# Patient Record
Sex: FEMALE | Race: BLACK OR AFRICAN AMERICAN | NOT HISPANIC OR LATINO | Employment: FULL TIME | URBAN - METROPOLITAN AREA
[De-identification: names, ages, dates, MRNs, and addresses within clinical notes are randomized per-mention and may not be internally consistent; named-entity substitution may affect disease eponyms.]

---

## 2023-10-31 ENCOUNTER — TELEPHONE (OUTPATIENT)
Dept: SURGERY | Facility: CLINIC | Age: 43
End: 2023-10-31
Payer: MEDICAID

## 2023-11-02 RX ORDER — DULOXETIN HYDROCHLORIDE 20 MG/1
20 CAPSULE, DELAYED RELEASE ORAL DAILY
COMMUNITY
End: 2023-11-06

## 2023-11-02 RX ORDER — LAMOTRIGINE 100 MG/1
100 TABLET ORAL DAILY
COMMUNITY
End: 2023-11-06

## 2023-11-02 RX ORDER — FLUVOXAMINE MALEATE 25 MG/1
25 TABLET ORAL NIGHTLY
COMMUNITY
End: 2023-11-06

## 2023-11-02 RX ORDER — PANTOPRAZOLE SODIUM 40 MG/1
40 TABLET, DELAYED RELEASE ORAL DAILY
COMMUNITY

## 2023-11-02 RX ORDER — MELOXICAM 7.5 MG/1
7.5 TABLET ORAL DAILY
COMMUNITY
End: 2024-03-25

## 2023-11-02 RX ORDER — LEVOTHYROXINE SODIUM 100 UG/1
100 TABLET ORAL
COMMUNITY
End: 2024-03-25

## 2023-11-02 RX ORDER — METFORMIN HYDROCHLORIDE 500 MG/1
500 TABLET ORAL 2 TIMES DAILY WITH MEALS
COMMUNITY
End: 2023-11-06

## 2023-11-02 RX ORDER — LISINOPRIL 5 MG/1
5 TABLET ORAL DAILY
COMMUNITY

## 2023-11-02 RX ORDER — DEXTROAMPHETAMINE SACCHARATE, AMPHETAMINE ASPARTATE MONOHYDRATE, DEXTROAMPHETAMINE SULFATE AND AMPHETAMINE SULFATE 3.75; 3.75; 3.75; 3.75 MG/1; MG/1; MG/1; MG/1
15 CAPSULE, EXTENDED RELEASE ORAL EVERY MORNING
COMMUNITY
End: 2023-11-06

## 2023-11-06 ENCOUNTER — CLINICAL SUPPORT (OUTPATIENT)
Dept: BARIATRICS | Facility: HOSPITAL | Age: 43
End: 2023-11-06

## 2023-11-06 ENCOUNTER — OFFICE VISIT (OUTPATIENT)
Dept: SURGERY | Facility: CLINIC | Age: 43
End: 2023-11-06
Payer: MEDICAID

## 2023-11-06 VITALS
BODY MASS INDEX: 51.23 KG/M2 | WEIGHT: 289.13 LBS | DIASTOLIC BLOOD PRESSURE: 83 MMHG | HEIGHT: 63 IN | SYSTOLIC BLOOD PRESSURE: 138 MMHG | HEART RATE: 81 BPM

## 2023-11-06 VITALS — WEIGHT: 289.13 LBS | BODY MASS INDEX: 51.23 KG/M2 | HEIGHT: 63 IN

## 2023-11-06 DIAGNOSIS — K21.9 GASTROESOPHAGEAL REFLUX DISEASE, UNSPECIFIED WHETHER ESOPHAGITIS PRESENT: ICD-10-CM

## 2023-11-06 DIAGNOSIS — E66.01 MORBID OBESITY WITH BMI OF 50.0-59.9, ADULT: Primary | ICD-10-CM

## 2023-11-06 DIAGNOSIS — K44.9 HIATAL HERNIA: ICD-10-CM

## 2023-11-06 DIAGNOSIS — E66.9 OBESITY, UNSPECIFIED CLASSIFICATION, UNSPECIFIED OBESITY TYPE, UNSPECIFIED WHETHER SERIOUS COMORBIDITY PRESENT: Primary | ICD-10-CM

## 2023-11-06 DIAGNOSIS — Z87.19 HISTORY OF ESOPHAGEAL STRICTURE: ICD-10-CM

## 2023-11-06 DIAGNOSIS — E66.9 OBESITY: Primary | ICD-10-CM

## 2023-11-06 PROCEDURE — 1159F PR MEDICATION LIST DOCUMENTED IN MEDICAL RECORD: ICD-10-PCS | Mod: CPTII,,, | Performed by: SURGERY

## 2023-11-06 PROCEDURE — 3008F BODY MASS INDEX DOCD: CPT | Mod: CPTII,,, | Performed by: SURGERY

## 2023-11-06 PROCEDURE — 3079F PR MOST RECENT DIASTOLIC BLOOD PRESSURE 80-89 MM HG: ICD-10-PCS | Mod: CPTII,,, | Performed by: SURGERY

## 2023-11-06 PROCEDURE — 3008F PR BODY MASS INDEX (BMI) DOCUMENTED: ICD-10-PCS | Mod: CPTII,,, | Performed by: SURGERY

## 2023-11-06 PROCEDURE — 99204 OFFICE O/P NEW MOD 45 MIN: CPT | Mod: ,,, | Performed by: SURGERY

## 2023-11-06 PROCEDURE — 1159F MED LIST DOCD IN RCRD: CPT | Mod: CPTII,,, | Performed by: SURGERY

## 2023-11-06 PROCEDURE — 99204 PR OFFICE/OUTPT VISIT, NEW, LEVL IV, 45-59 MIN: ICD-10-PCS | Mod: ,,, | Performed by: SURGERY

## 2023-11-06 PROCEDURE — 3075F PR MOST RECENT SYSTOLIC BLOOD PRESS GE 130-139MM HG: ICD-10-PCS | Mod: CPTII,,, | Performed by: SURGERY

## 2023-11-06 PROCEDURE — 4010F PR ACE/ARB THEARPY RXD/TAKEN: ICD-10-PCS | Mod: CPTII,,, | Performed by: SURGERY

## 2023-11-06 PROCEDURE — 3075F SYST BP GE 130 - 139MM HG: CPT | Mod: CPTII,,, | Performed by: SURGERY

## 2023-11-06 PROCEDURE — 3079F DIAST BP 80-89 MM HG: CPT | Mod: CPTII,,, | Performed by: SURGERY

## 2023-11-06 PROCEDURE — 4010F ACE/ARB THERAPY RXD/TAKEN: CPT | Mod: CPTII,,, | Performed by: SURGERY

## 2023-11-06 NOTE — PROGRESS NOTES
"NUTRITIONAL CONSULT    Initial assessment for sleeve gastrectomy  Non Surgical: []    PAST HISTORY:   Dieting attempts include Nutrisytem  and Diet supplements/ medication Ozempic and Adipex/ Phentermine  Highest adult weight: 289#  How many years at present wt:   Greatest single wt loss:  40#     CLINICAL DATA:  Height:   Ht Readings from Last 1 Encounters:   23 5' 2.5" (1.588 m)      Weight:   Wt Readings from Last 3 Encounters:   23 1459 131.1 kg (289 lb 1.6 oz)   23 1434 131.1 kg (289 lb 1.6 oz)      IBW: 115 lbs   BMI:   BMI Readings from Last 1 Encounters:   23 52.03 kg/m²      Bariatric goal weight (125% EBW): 144 lbs  Patient's goal weight: 200 lbs    FAMILY HISTORY OF OBESITY:  No           WHAT SIDE OF THE FAMILY?   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 Eats 2-3 times per week.  Fruits: Likes a variety. Eats almost daily.  Beverages: soda and juice   Alcohol consumption:  socially   Type:   Dining out: Daily. Mostly fast food, restaurants, and take-out.  Grocery shopping and food prep: Yes[x]Self   []Spouse   []Other:   Emotional eating: Yes Which emotions if yes: stress and happiness   Nighttime snacking: Yes Middle of night: Yes Before bedtime: Nob   sweets or chips   Hx of disordered eating behaviors:  Anorexia: No Bulimia:  Purging: No Binging:No  History of vitamin/mineral deficiencies:   No    Vitamins / Minerals / Herbs:    going " through menopause   Mg    Food Allergies:   None       ASSESSMENT:  Patient reports attempts at weight loss, only to regain lost weight.  Patient demonstrated knowledge of healthy eating behaviors and exercise patterns; admits to not eating healthy and not exercising at this point.  Patient states willingness to change lifestyle and make behavior modifications.  Expect fair  compliance after surgery at this time.        ESTIMATED NEEDS:  Calories: 7357-0845 (20-25 kcal/kg adjusted BW/d)  Protein: 65-72 (1.0-1.1 g/kg adjusted BW/d)  Fluid:  1300 (20 mL/kg actual BW/d)    BARIATRIC DIET DISCUSSION:  Discussed diet after surgery and related to patients food record.  Reviewed diet progression before and after surgery.  Reinforced that surgery is not a magic bullet and importance of low fat foods and no snacking.  Answered all questions.    RECOMMENDATIONS:  Patient is a good candidate for bariatric surgery.      PLAN:  Continue to review Bariatric Nutrition Guidebook at home and call with any questions.  Work on Bariatric Nutrition Checklist.  Work on expanding variety of vegetables.  Work on gradually cutting back on starchy CHO in the diet.  1500-calorie diet.  5-6 meals per day.  Start including protein supplements in the diet plan daily.  More grocery shopping and meal preparation at home.  Start shopping for bariatric vitamins & minerals.

## 2023-11-06 NOTE — PROGRESS NOTES
[facility] Surgical - General Surgery Services  [address] [place] [id number]  Phone: [phone]  Fax: [phone]    Initial Bariatric Consultation  Dr. Marcos Pérez  1980    Author: DAVIDA Mensah      History of Present Illness:  Patient is a 43 y.o. female who is referred for evaluation of surgical treatment of morbid obesity. Her Body mass index is 52.03 kg/m². She has known comorbidities of dyslipidemia, GERD, hypertension, and obstructive sleep apnea. She has attended the bariatric seminar and is most interested in the sleeve gastrectomy procedure. The patient has had multiple unsuccessful diet attempts in the past without sustained weight loss. With multiple unsuccessful weight loss attempts, the patient believes they are ready for a bariatric surgical intervention.     Denies any anemia, bleeding or clotting disorders, easy bruisability, or previous thrombosis. No family history of clotting issues.     Ht: 62.5 in.  Weights:     Trend Weights BMI   Weight today at consult 289#  52             Pertinent History:  HTN - [x] Yes   [] No  HLD - [x] Yes   [] No  DM  -  [] Yes   [x] No  ISIDRA - [x] Yes   [] No  GERD - [x] Yes   [] No (severe)  Anticoagulation- [] Yes   [x] No  Smoker- [] Yes   [x] No Former smoker.       History of chronic GERD, hiatal hernia, esophageal stricture.  No previous GI records available at time of consult.     Past Medical History:   Diagnosis Date    Anxiety     Depression     Esophageal stricture     Former smoker     GERD (gastroesophageal reflux disease)     Hiatal hernia     Hyperlipidemia     Hypertension     Morbid obesity     Sleep apnea, unspecified     SOB (shortness of breath) on exertion     Thyroid disease     hypothyroidism    Vitamin D deficiency      Past Surgical History:   Procedure Laterality Date     SECTION      x 2    TUBAL LIGATION       No family history on file.  Social History     Tobacco  "Use    Smoking status: Former     Types: Cigarettes   Substance Use Topics    Alcohol use: Not Currently          Review of patient's allergies indicates:  No Known Allergies    Current Outpatient Medications   Medication Sig Dispense Refill    albuterol sulfate 90 mcg/actuation aebs Inhale 180 mcg into the lungs every 4 (four) hours.      cholecalciferol, vitamin D3, 1,250 mcg/3 mL Drop Take by mouth.      dextroamphetamine-amphetamine (ADDERALL XR) 15 MG 24 hr capsule Take 15 mg by mouth every morning.      DULoxetine (CYMBALTA) 20 MG capsule Take 20 mg by mouth once daily.      fluvoxaMINE (LUVOX) 25 MG tablet Take 25 mg by mouth every evening.      lamoTRIgine (LAMICTAL) 100 MG tablet Take 100 mg by mouth once daily.      levothyroxine (SYNTHROID) 100 MCG tablet Take 100 mcg by mouth before breakfast.      lisinopriL (PRINIVIL,ZESTRIL) 5 MG tablet Take 5 mg by mouth once daily.      meloxicam (MOBIC) 7.5 MG tablet Take 7.5 mg by mouth once daily.      metFORMIN (GLUCOPHAGE) 500 MG tablet Take 500 mg by mouth 2 (two) times daily with meals.      pantoprazole (PROTONIX) 40 MG tablet Take 40 mg by mouth once daily.       No current facility-administered medications for this visit.       Patient Care Team:  No, Primary Doctor as PCP - General  Marcos Quintero MD as Surgeon (Bariatrics)    Review of Systems:  Review of Systems   Constitutional: Negative.    HENT: Negative.     Eyes: Negative.    Respiratory: Negative.     Cardiovascular: Negative.    Gastrointestinal: Negative.    Endocrine: Negative.    Genitourinary: Negative.    Musculoskeletal: Negative.    Skin: Negative.    Allergic/Immunologic: Negative.    Neurological: Negative.    Psychiatric/Behavioral: Negative.     All other systems reviewed and are negative.      Physical:     Vitals:    11/06/23 1459   BP: 138/83   Pulse: 81   Weight: 131.1 kg (289 lb 1.6 oz)   Height: 5' 2.5" (1.588 m)     Body mass index is 52.03 kg/m².    Physical Exam:  Physical " Exam  Vitals reviewed.   Constitutional:       General: She is not in acute distress.     Appearance: Normal appearance.   HENT:      Head: Normocephalic.   Eyes:      Conjunctiva/sclera: Conjunctivae normal.      Pupils: Pupils are equal, round, and reactive to light.   Cardiovascular:      Rate and Rhythm: Normal rate and regular rhythm.      Pulses: Normal pulses.      Heart sounds: Normal heart sounds.   Pulmonary:      Effort: Pulmonary effort is normal. No respiratory distress.      Breath sounds: Normal breath sounds.   Abdominal:      General: Abdomen is flat. Bowel sounds are normal.      Palpations: Abdomen is soft.      Tenderness: There is no abdominal tenderness.   Musculoskeletal:         General: Normal range of motion.      Cervical back: Neck supple.   Skin:     General: Skin is warm and dry.   Neurological:      General: No focal deficit present.      Mental Status: She is alert and oriented to person, place, and time.   Psychiatric:         Mood and Affect: Mood normal.         Behavior: Behavior normal.         ASSESSMENT/PLAN:        1. Morbid obesity with BMI of 50.0-59.9, adult        2. Gastroesophageal reflux disease, unspecified whether esophagitis present        3. Hiatal hernia        4. History of esophageal stricture            Plan:  Valerie Pérez has morbid obesity as their Body mass index is 52.03 kg/m². She would benefit from weight loss surgery and has chosen Laparoscopic sleeve gastrectomy as the preferred procedure. She understands that this is a tool and lifestyle change will be necessary to keep weight off. Dr. Marcos Quintero discussed possible complications of all surgeries with the patient and she is agreeable to surgery. Patient informed that sleeve gastrectomy may result in symptoms of worsening reflux, possibly requiring long term medication, or additional surgical intervention in the future (including but not limited to conversion to Sheela-en-Y gastric bypass). Future  surgical intervention may/may not be covered by patient's insurance. Patient accepts this risk and is willing to proceed with workup for sleeve gastrectomy procedure.     RECOMMENDATION: Weight loss surgery. The risks, benefits of bariatric surgery, and non-surgical alternatives were discussed at length and all  questions were answered. The patient verbalizes understanding and wishes to proceed.     - Obtain previous GI records RE GERD and hiatal hernia, esophageal stricture if possible for review.  - Order esophagram to evaluate severity of GERD   - Order H Pylori serology  - Schedule EGD upper endoscopy  - Refer to bariatric department for pre op education, nutritional counseling.   - Refer for pre op psychological evaluation.  - Dr. Quintero examined patient, discussed recommendations, and answered all questions.     DAVIDA Mensah      I, DAVIDA Montes, am scribing for, and in the presence of, Marcos Quintero MD, performed the above scribed service and the documentation accurately describes the services I performed. I attest to the accuracy of the note.

## 2023-11-06 NOTE — PROGRESS NOTES
BEHAVIOR MODIFICATION AND EXERCISE CONSULT    PERSONAL:     What initiated your interest in bariatric surgery? Hypothyroidism, HTN, High cholesterol    Marital Status: [x]Single   []   []   []      Do you have children? 3 (19,20,22)    What is your highest level of education completed? 11th grade    Who is your social or relational support? friends    Do you work? 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 yes    Were there any particular events that lead to significant weight gain? []Loss of a loved one  []Pregnancy  [x]Trauma, accident, illness  []Loss of employment    PHYSICAL ACTIVITY:    Do you currently exercise: []Yes   [x]No    If so, please describe:    Have you experienced any injuries and/or restrictions that may limit your physical activity? [x]Yes   []No    If so, please describe: knees    How do you feel about exercise? I would like to do it.    BEHAVIORS:    What behavior(s) would you like to change in order to be healthy? Eating habits, emotional eating, exercise.    On a scale of 1-10 (1-extremely low, 10-extremely high), how motivated are you to change your behavior(s)? 10    Do you currently use any type of tobacco products (vape, dip, cigarettes, etc.) No    If yes, on average, how many and/or how often do you use these products on a daily basis?    How many hours of sleep do you average? 7    Rate your stress level on a scale of 1-10? 8     (1-extremely low, 10-extremely high)     What is your biggest stressor? Taking care of mom or family member    Is your appetite affected by stress, boredom, depression, etc.? yes    How do you cope and/or manage stress? Be along and listen to music    Have you ever seen a counselor or therapist? Yes, depression, ADHD    CURRENT WEIGHT: 289  HIGHEST WEIGHT:289  LOWEST ADULT WEIGHT: 230    WAIST/HIP: 53/60    HANDOUTS: Emotional connections to food, stress  management    NOTES: Body composition was conducted and patient was educated on results.    CORIE Khoury, CPT, CHC        CORIE Khoury, CPT, CHC

## 2023-11-07 ENCOUNTER — TELEPHONE (OUTPATIENT)
Dept: SURGERY | Facility: CLINIC | Age: 43
End: 2023-11-07
Payer: MEDICAID

## 2023-11-07 DIAGNOSIS — K21.9 GASTROESOPHAGEAL REFLUX DISEASE, UNSPECIFIED WHETHER ESOPHAGITIS PRESENT: Primary | ICD-10-CM

## 2023-11-07 NOTE — TELEPHONE ENCOUNTER
Esophagram scheduled for 11/13/2023 at 1:00 p.m. @ [facility].   Attempted to notify pt. No answer, unable to LVM

## 2023-11-07 NOTE — TELEPHONE ENCOUNTER
If pt needs to reschedule testing per [place] she will have to call herself. #[phone]    Initial (On Arrival)

## 2023-11-13 ENCOUNTER — OUTSIDE PLACE OF SERVICE (OUTPATIENT)
Dept: INTERVENTIONAL RADIOLOGY/VASCULAR | Facility: CLINIC | Age: 43
End: 2023-11-13
Payer: MEDICAID

## 2023-11-13 PROCEDURE — 74220 X-RAY XM ESOPHAGUS 1CNTRST: CPT | Mod: 26,,, | Performed by: STUDENT IN AN ORGANIZED HEALTH CARE EDUCATION/TRAINING PROGRAM

## 2023-11-16 ENCOUNTER — TELEPHONE (OUTPATIENT)
Dept: SURGERY | Facility: CLINIC | Age: 43
End: 2023-11-16
Payer: MEDICAID

## 2023-11-16 NOTE — TELEPHONE ENCOUNTER
----- Message from DAVIDA Mensah sent at 11/16/2023  3:31 PM CST -----  Small hiatal hernia but moderate amount of GERD.     Proceed with EGD as a next step.     After EGD findings, will determine if still a good candidate for VSG or if RNY needed.     Please inform pt.   ----- Message -----  From: Esther Urbano MA  Sent: 11/15/2023   1:41 PM CST  To: DAVIDA Mensah; Haily Ta RN; #    Esophagram results now in chart. Small HH and moderate GERD.  Please advise. Pt medicaid VSG

## 2023-11-21 ENCOUNTER — CLINICAL SUPPORT (OUTPATIENT)
Dept: BARIATRICS | Facility: HOSPITAL | Age: 43
End: 2023-11-21

## 2023-11-21 VITALS — WEIGHT: 285 LBS | HEIGHT: 63 IN | BODY MASS INDEX: 50.5 KG/M2

## 2023-11-21 DIAGNOSIS — E66.01 MORBID OBESITY WITH BMI OF 50.0-59.9, ADULT: Primary | ICD-10-CM

## 2023-11-21 PROCEDURE — 94200034 HC BARIATRIC NUTRITIONAL SVCS PT GRADE I: Performed by: DIETITIAN, REGISTERED

## 2023-11-21 NOTE — PROGRESS NOTES
"Patient Education [x] [facility] Visit Number: 1/3     []  Pre-op Wt Loss Goal (as ordered on referral):   [] [facility] Visit:     Height:   Ht Readings from Last 1 Encounters:   11/21/23 5' 2.5" (1.588 m)      Weight:   Wt Readings from Last 3 Encounters:   11/21/23 1536 129.3 kg (285 lb)   11/06/23 1459 131.1 kg (289 lb 1.6 oz)   11/06/23 1434 131.1 kg (289 lb 1.6 oz)      BMI:   BMI Readings from Last 1 Encounters:   11/21/23 51.30 kg/m²                                                                          Barriers to learning:  [x]None evident  []Acuity of illness  []Cognitive defects  []Cultural barriers  []Desire/Motivation  []Difficulty concentrating  []Emotional state  []Financial concerns  []Hearing deficit  []Language barrier  []Literacy  []Memory problems  []Vision impairment     Home caregiver present for session   []YES  [x] NO       Teaching methods:  []Demonstration  [x]Explanation  []Printed materials  []Teach back  []Virtual/web based         Verbalizes understanding Demonstrates  Needs further teaching Needs practice/ supervision Comments    Bariatric Surgery Diet  [] [] [] []    Clear liquid [] [] [] []    Full liquid [] [] [] []    Weight Reduction [x] [] [] []    Other Diet [] [] [] []          Additional Learner (s) Present                                                                  []Spouse   []Daughter    []  Son  []Family member   []Friend   []Grandfather   []Grandmother   []Father   []Mother   []Other       Expected Compliance:  [x]Good  []Fair  []Poor    Additional Information:    Pt with 5# wt loss since last visit. Pt has eliminated sodas completely; replacing these with Gatorade Zero, SF Chuy Aid flavored water. Pt denies snacking on chips, sweets, cookies. Pt has been prepping meals for workdays to remain on track with diet.    24 hr recall:  B- egg  L- chicken, tomatoes, carrots  Sn- fruit, beef jerky, boiled egg  S- similar to lunch  Bevs- gatorade zero, SF chuy aid    Plan:  Continue " current meal plan.  Daily practice of bariatric eating habits.

## 2023-11-22 NOTE — TELEPHONE ENCOUNTER
I've tried reaching out to patient multiple times with no answers or call backs. I did email her the results.

## 2023-12-07 ENCOUNTER — TELEPHONE (OUTPATIENT)
Dept: SURGERY | Facility: CLINIC | Age: 43
End: 2023-12-07
Payer: MEDICAID

## 2023-12-07 DIAGNOSIS — K21.9 GASTROESOPHAGEAL REFLUX DISEASE, UNSPECIFIED WHETHER ESOPHAGITIS PRESENT: Primary | ICD-10-CM

## 2023-12-07 NOTE — TELEPHONE ENCOUNTER
This EGD is from 2021. Needs repeat EGD with Dr. Quintero if not already scheduled.   ----- Message -----   From: Arlet Cross   Sent: 11/29/2023  10:25 AM CST   To: DAVIDA Mensah; Esther Urbano MA   Subject: FW: Import                                        Please see attached pt records from GI as requested.   Please advise if pt needs to repeat EGD w/ Dr. Quintero or if any other testing needs to be completed.   ----- Message -----   From: Loraine Hoskins   Sent: 11/29/2023  10:15 AM CST   To: Arlet Cross   Subject: Import                                            Imported by Loraine Hoskins on 11/29/2023 at 10:15 AM to the following: Valerie Pérez

## 2023-12-19 ENCOUNTER — CLINICAL SUPPORT (OUTPATIENT)
Dept: BARIATRICS | Facility: HOSPITAL | Age: 43
End: 2023-12-19

## 2023-12-19 VITALS — BODY MASS INDEX: 51.94 KG/M2 | WEIGHT: 288.63 LBS

## 2023-12-19 DIAGNOSIS — E66.9 OBESITY, UNSPECIFIED CLASSIFICATION, UNSPECIFIED OBESITY TYPE, UNSPECIFIED WHETHER SERIOUS COMORBIDITY PRESENT: Primary | ICD-10-CM

## 2023-12-19 PROCEDURE — 94200034 HC BARIATRIC NUTRITIONAL SVCS PT GRADE I

## 2023-12-19 NOTE — PROGRESS NOTES
"Patient Education [x] [facility] Visit Number:  2/3      []  Pre-op Wt Loss Goal (as ordered on referral):   [] [facility] Visit:     Height:   Ht Readings from Last 1 Encounters:   11/21/23 5' 2.5" (1.588 m)      Weight:   Wt Readings from Last 3 Encounters:   12/19/23 1525 130.9 kg (288 lb 9.6 oz)   11/21/23 1536 129.3 kg (285 lb)   11/06/23 1459 131.1 kg (289 lb 1.6 oz)      BMI:   BMI Readings from Last 1 Encounters:   12/19/23 51.94 kg/m²                                                                          Barriers to learning:  [x]None evident  []Acuity of illness  []Cognitive defects  []Cultural barriers  []Desire/Motivation  []Difficulty concentrating  []Emotional state  []Financial concerns  []Hearing deficit  []Language barrier  []Literacy  []Memory problems  []Vision impairment     Home caregiver present for session   []YES  [] NO       Teaching methods:  []Demonstration  []Explanation  []Printed materials  []Teach back  []Virtual/web based         Verbalizes understanding Demonstrates  Needs further teaching Needs practice/ supervision Comments    Bariatric Surgery Diet  [] [] [] []    Clear liquid [] [] [] []    Full liquid [] [] [] []    Weight Reduction [x] [] [] []    Other Diet [] [] [] []          Additional Learner (s) Present                                                                  []Spouse   []Daughter    []  Son  []Family member   []Friend   []Grandfather   []Grandmother   []Father   []Mother   []Other       Expected Compliance:  []Good  []Fair  []Poor    Additional Information:    Pt has a stressful month so she stopped prepping and planing her meals. She gained back the weigh that she lost last month. We talked about other ways to cope with her stress like walking. She said she knows she feels better when she eats the right foods.   She will get back on track this month       Plan:  Meal prep and plan   Portion out - follow modified pre op diet     "

## 2024-01-16 ENCOUNTER — TELEPHONE (OUTPATIENT)
Dept: BARIATRICS | Facility: HOSPITAL | Age: 44
End: 2024-01-16

## 2024-01-17 ENCOUNTER — TELEPHONE (OUTPATIENT)
Dept: BARIATRICS | Facility: HOSPITAL | Age: 44
End: 2024-01-17

## 2024-03-25 ENCOUNTER — OFFICE VISIT (OUTPATIENT)
Dept: OBSTETRICS AND GYNECOLOGY | Facility: CLINIC | Age: 44
End: 2024-03-25
Payer: COMMERCIAL

## 2024-03-25 VITALS
BODY MASS INDEX: 52.56 KG/M2 | SYSTOLIC BLOOD PRESSURE: 138 MMHG | DIASTOLIC BLOOD PRESSURE: 89 MMHG | HEART RATE: 95 BPM | WEIGHT: 292 LBS

## 2024-03-25 DIAGNOSIS — Z76.89 ENCOUNTER TO ESTABLISH CARE: ICD-10-CM

## 2024-03-25 DIAGNOSIS — N95.2 VAGINAL ATROPHY: ICD-10-CM

## 2024-03-25 DIAGNOSIS — N92.0 MENORRHAGIA WITH REGULAR CYCLE: ICD-10-CM

## 2024-03-25 DIAGNOSIS — N95.1 MENOPAUSE SYNDROME: Primary | ICD-10-CM

## 2024-03-25 DIAGNOSIS — R39.15 URINARY URGENCY: ICD-10-CM

## 2024-03-25 DIAGNOSIS — R35.1 NOCTURIA: ICD-10-CM

## 2024-03-25 LAB
B-HCG UR QL: NEGATIVE
CTP QC/QA: YES

## 2024-03-25 PROCEDURE — 3079F DIAST BP 80-89 MM HG: CPT | Mod: CPTII,S$GLB,,

## 2024-03-25 PROCEDURE — 99203 OFFICE O/P NEW LOW 30 MIN: CPT | Mod: S$GLB,,,

## 2024-03-25 PROCEDURE — 3075F SYST BP GE 130 - 139MM HG: CPT | Mod: CPTII,S$GLB,,

## 2024-03-25 PROCEDURE — 3008F BODY MASS INDEX DOCD: CPT | Mod: CPTII,S$GLB,,

## 2024-03-25 PROCEDURE — 4010F ACE/ARB THERAPY RXD/TAKEN: CPT | Mod: CPTII,S$GLB,,

## 2024-03-25 PROCEDURE — 1159F MED LIST DOCD IN RCRD: CPT | Mod: CPTII,S$GLB,,

## 2024-03-25 PROCEDURE — 1160F RVW MEDS BY RX/DR IN RCRD: CPT | Mod: CPTII,S$GLB,,

## 2024-03-25 PROCEDURE — 81025 URINE PREGNANCY TEST: CPT | Mod: S$GLB,,,

## 2024-03-25 RX ORDER — FLUVOXAMINE MALEATE 50 MG/1
50 TABLET, FILM COATED ORAL NIGHTLY
COMMUNITY
Start: 2023-12-20 | End: 2024-03-28

## 2024-03-25 RX ORDER — ROSUVASTATIN CALCIUM 10 MG/1
10 TABLET, COATED ORAL NIGHTLY
COMMUNITY
Start: 2024-02-24

## 2024-03-25 RX ORDER — DEXTROAMPHETAMINE SACCHARATE, AMPHETAMINE ASPARTATE, DEXTROAMPHETAMINE SULFATE AND AMPHETAMINE SULFATE 3.75; 3.75; 3.75; 3.75 MG/1; MG/1; MG/1; MG/1
15 TABLET ORAL 2 TIMES DAILY
COMMUNITY
Start: 2023-12-20 | End: 2024-05-08

## 2024-03-25 RX ORDER — IBUPROFEN 800 MG/1
TABLET ORAL
COMMUNITY
Start: 2024-02-27 | End: 2024-05-24 | Stop reason: ALTCHOICE

## 2024-03-25 RX ORDER — LEVOTHYROXINE SODIUM 75 UG/1
75 TABLET ORAL
COMMUNITY
Start: 2024-02-25

## 2024-03-25 RX ORDER — ESTRADIOL 0.1 MG/G
CREAM VAGINAL
COMMUNITY
Start: 2023-10-19 | End: 2024-03-25

## 2024-03-25 RX ORDER — ESTRADIOL 4 UG/1
1 INSERT VAGINAL
Qty: 24 EACH | Refills: 3 | Status: SHIPPED | OUTPATIENT
Start: 2024-03-25

## 2024-03-25 NOTE — PROGRESS NOTES
Subjective:      Patient ID: Valerie Pérez is a 44 y.o. female who presents for evaluation today.    Chief Complaint:    Perimenopausal (Pt thinks that she is perimenopausal. She states that she is having hot flashes, low energy, no sex drive, bad vaginal dryness, headaches, brain fog and trouble sleeping.)      History of Present Illness  HPI She reports symptoms of perimenopause, noting headaches, hot flashes, night sweats, vaginal dryness, low libido, brain fog & fatigue x 6 mos. She tried estrace cream for awhile, but hasn't noticed improvement vaginally. She reports regular periods, denies mid cycle spotting. Cycles ranging from 3-4 weeks. Her periods last 5-6 days. She changes pads every 2 hours, wears 2 pads at a time or depends on a heavy day. 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 She often bleeds through her pads. She had an ablation a few years ago, but it did not help her periods. She is unaware if she had her tubes tied. She is currently sexually active, using condoms. She has tried a few different menopause supplements from online, which have not helped her symptoms. She has seen the community clinic in [place] for her primary care but needs to establish care here with her new insurance. She reports regular bowel movements. Denies stress urinary incontinence, but notes increasing urgency at night-time, up at least 4 times per night.     GYN History  Patient's last menstrual period was 03/01/2024 (exact date).   Date of Last Pap: N/A    VITALS  /89   Pulse 95   Wt 132.5 kg (292 lb)   LMP 03/01/2024 (Exact Date)   BMI 52.56 kg/m²   Weight: 132.5 kg (292 lb)         PAST MEDICAL HISTORY  Past Medical History:   Diagnosis Date    Anxiety     Depression     Esophageal stricture     Former smoker     GERD (gastroesophageal reflux disease)     Hiatal hernia     Hyperlipidemia     Hypertension     Morbid obesity     Sleep apnea, unspecified     SOB (shortness of breath) on exertion     Thyroid  disease     hypothyroidism    Vitamin D deficiency        PAST SURGICAL HISTORY  Past Surgical History:   Procedure Laterality Date     SECTION      x 2    right femur Right     TUBAL LIGATION         SOCIAL HISTORY  Social History     Tobacco Use   Smoking Status Every Day    Types: Cigarettes    Start date: 2023   Smokeless Tobacco Never   ,   Social History     Substance and Sexual Activity   Alcohol Use Not Currently        MEDICATIONS  Outpatient Medications Marked as Taking for the 3/25/24 encounter (Office Visit) with Christina Aiken NP   Medication Sig Dispense Refill    dextroamphetamine-amphetamine (ADDERALL) 15 mg tablet Take 15 mg by mouth 2 (two) times daily.      fluvoxaMINE (LUVOX) 50 MG Tab tablet Take 50 mg by mouth every evening.      ibuprofen (ADVIL,MOTRIN) 800 MG tablet TAKE 1 TABLET BY MOUTH EVERY 8 HOURS WITH FOOD AS NEEDED FOR PAIN. MAY TAKE WITH TYLENOL      levothyroxine (SYNTHROID) 75 MCG tablet Take 75 mcg by mouth.      lisinopriL (PRINIVIL,ZESTRIL) 5 MG tablet Take 5 mg by mouth once daily.      pantoprazole (PROTONIX) 40 MG tablet Take 40 mg by mouth once daily.      rosuvastatin (CRESTOR) 10 MG tablet Take 10 mg by mouth every evening.      [DISCONTINUED] estradioL (ESTRACE) 0.01 % (0.1 mg/gram) vaginal cream Place vaginally.           Review of Systems   Review of Systems   Constitutional:  Negative for activity change.   Eyes:  Negative for visual disturbance.   Respiratory:  Negative for shortness of breath.    Cardiovascular:  Negative for chest pain.   Gastrointestinal:  Negative for abdominal pain.   Genitourinary:  Positive for decreased libido, hot flashes, urgency and vaginal dryness. Negative for vaginal bleeding.        No abnormal vaginal bleeding   Musculoskeletal:  Negative for back pain.   Integumentary:  Negative for rash and breast mass.   Neurological:  Negative for numbness.   Psychiatric/Behavioral:          No mood disturbance or changes    Breast:  Negative for mass          Objective:     Physical Exam:   Constitutional: She is oriented to person, place, and time. No distress.      Neck: No thyromegaly present.    Cardiovascular:  Normal rate and regular rhythm.             Pulmonary/Chest: Effort normal and breath sounds normal. No respiratory distress.        Abdominal: Soft. She exhibits no distension. There is no abdominal tenderness.             Musculoskeletal: Moves all extremeties.       Neurological: She is alert and oriented to person, place, and time.    Skin: Skin is warm and dry.    Psychiatric: She has a normal mood and affect. Her behavior is normal.          Assessment:        1. Menopause syndrome    2. Menorrhagia with regular cycle    3. Encounter to establish care    4. Vaginal atrophy    5. Urinary urgency    6. Nocturia       Menopause syndrome  -     Testosterone; Future; Expected date: 03/25/2024  -     Testosterone, Free; Future; Expected date: 03/25/2024  -     Comprehensive Metabolic Panel; Future; Expected date: 03/25/2024  -     Estradiol; Future; Expected date: 03/25/2024  -     Follicle Stimulating Hormone; Future; Expected date: 03/25/2024  -     Luteinizing Hormone; Future; Expected date: 03/25/2024  -     T4, Free; Future; Expected date: 03/25/2024  -     TSH; Future; Expected date: 03/25/2024  -     US OB/GYN Procedure ([facility]); Future  -     CBC Auto Differential; Future    Menorrhagia with regular cycle  -     US OB/GYN Procedure ([facility]); Future  -     POCT urine pregnancy    Encounter to establish care  -     Ambulatory referral/consult to Internal Medicine; Future; Expected date: 04/01/2024    Vaginal atrophy  -     IMVEXXY MAINTENANCE PACK 4 mcg Inst; Place 1 suppository vaginally twice a week.  Dispense: 24 each; Refill: 3    Urinary urgency  -     Ambulatory referral/consult to Urology; Future; Expected date: 04/01/2024    Nocturia  -     Ambulatory referral/consult to Urology; Future; Expected date:  04/01/2024         Plan:     Patient instructed to contact the clinic should any questions or concerns arise prior to her next office visit. Patient is happy with the plan of care at this time, verbalizes understanding and denies outstanding questions.      Imvexxy samples given   R/B discussed with HRT, smoking cessation encouraged   F/U in 1 mos for annual/med check & to discuss lab results  If you don't hear from the office regarding results within 1 week, please call

## 2024-03-26 LAB
ABS NRBC COUNT: 0 X 10 3/UL (ref 0–0.01)
ABSOLUTE BASOPHIL: 0.03 X 10 3/UL (ref 0–0.22)
ABSOLUTE EOSINOPHIL: 0.03 X 10 3/UL (ref 0.04–0.54)
ABSOLUTE IMMATURE GRAN: 0.04 X 10 3/UL (ref 0–0.04)
ABSOLUTE LYMPHOCYTE: 2.91 X 10 3/UL (ref 0.86–4.75)
ABSOLUTE MONOCYTE: 0.38 X 10 3/UL (ref 0.22–1.08)
ALBUMIN SERPL-MCNC: 3.9 G/DL (ref 3.5–5.2)
ALBUMIN/GLOB SERPL ELPH: 1 {RATIO} (ref 1–2.7)
ALP ISOS SERPL LEV INH-CCNC: 88 U/L (ref 35–105)
ALT (SGPT): 13 U/L (ref 0–33)
ANION GAP SERPL CALC-SCNC: 9 MMOL/L (ref 8–17)
AST SERPL-CCNC: 12 U/L (ref 0–32)
BASOPHILS NFR BLD: 0.3 % (ref 0.2–1.2)
BILIRUBIN, TOTAL: 0.24 MG/DL (ref 0–1.2)
BUN/CREAT SERPL: 16.3 (ref 6–20)
CALCIUM SERPL-MCNC: 9.5 MG/DL (ref 8.6–10.2)
CARBON DIOXIDE, CO2: 27 MMOL/L (ref 22–29)
CHLORIDE: 103 MMOL/L (ref 98–107)
CREAT SERPL-MCNC: 0.75 MG/DL (ref 0.5–0.9)
E2: 67.9 PG/ML
EOSINOPHIL NFR BLD: 0.3 % (ref 0.7–7)
FREE TESTOSTERONE: 0.1 NG/DL (ref 0–1)
FSH: 5.16 MIU/ML
GFR ESTIMATION: 101.88 ML/MIN/1.73M2
GLOBULIN: 4.1 G/DL (ref 1.5–4.5)
GLUCOSE: 144 MG/DL (ref 74–106)
HCT VFR BLD AUTO: 35.4 % (ref 37–47)
HGB BLD-MCNC: 11 G/DL (ref 12–16)
IMMATURE GRANULOCYTES: 0.4 % (ref 0–0.5)
LH: 9.18 MIU/ML
LYMPHOCYTES NFR BLD: 29 % (ref 19.3–53.1)
MCH RBC QN AUTO: 28.6 PG (ref 27–32)
MCHC RBC AUTO-ENTMCNC: 31.1 G/DL (ref 32–36)
MCV RBC AUTO: 91.9 FL (ref 82–100)
MONOCYTES NFR BLD: 3.8 % (ref 4.7–12.5)
NEUTROPHILS # BLD AUTO: 6.66 X 10 3/UL (ref 2.15–7.56)
NEUTROPHILS NFR BLD: 66.2 % (ref 34–71.1)
NUCLEATED RED BLOOD CELLS: 0 /100 WBC (ref 0–0.2)
PLATELET # BLD AUTO: 386 X 10 3/UL (ref 135–400)
POTASSIUM: 4.6 MMOL/L (ref 3.5–5.1)
PROT SNV-MCNC: 8 G/DL (ref 6.4–8.3)
RBC # BLD AUTO: 3.85 X 10 6/UL (ref 4.2–5.4)
RDW-SD: 49.1 FL (ref 37–54)
SODIUM: 139 MMOL/L (ref 136–145)
T4, FREE: 1.09 NG/DL (ref 0.93–1.7)
TESTOST SERPL-MCNC: 9.14 NG/DL (ref 8.4–48.1)
TSH SERPL DL<=0.005 MIU/L-ACNC: 0.52 UIU/ML (ref 0.27–4.2)
UREA NITROGEN (BUN): 12.2 MG/DL (ref 6–20)
WBC # BLD: 10.05 X 10 3/UL (ref 4.3–10.8)

## 2024-03-27 ENCOUNTER — OFFICE VISIT (OUTPATIENT)
Dept: UROLOGY | Facility: CLINIC | Age: 44
End: 2024-03-27
Payer: COMMERCIAL

## 2024-03-27 VITALS — BODY MASS INDEX: 51.91 KG/M2 | WEIGHT: 293 LBS | HEIGHT: 63 IN

## 2024-03-27 DIAGNOSIS — R35.1 NOCTURIA: ICD-10-CM

## 2024-03-27 DIAGNOSIS — R39.15 URINARY URGENCY: ICD-10-CM

## 2024-03-27 LAB
BILIRUBIN, UA POC OHS: ABNORMAL
BLOOD, UA POC OHS: ABNORMAL
CLARITY, UA POC OHS: ABNORMAL
COLOR, UA POC OHS: YELLOW
GLUCOSE, UA POC OHS: NEGATIVE
KETONES, UA POC OHS: NEGATIVE
LEUKOCYTES, UA POC OHS: NEGATIVE
NITRITE, UA POC OHS: NEGATIVE
PH, UA POC OHS: 5.5
PROTEIN, UA POC OHS: ABNORMAL
SPECIFIC GRAVITY, UA POC OHS: >=1.03
UROBILINOGEN, UA POC OHS: 0.2

## 2024-03-27 PROCEDURE — 81003 URINALYSIS AUTO W/O SCOPE: CPT | Mod: QW,S$GLB,, | Performed by: FAMILY MEDICINE

## 2024-03-27 PROCEDURE — 1159F MED LIST DOCD IN RCRD: CPT | Mod: CPTII,S$GLB,, | Performed by: FAMILY MEDICINE

## 2024-03-27 PROCEDURE — 99204 OFFICE O/P NEW MOD 45 MIN: CPT | Mod: S$GLB,,, | Performed by: FAMILY MEDICINE

## 2024-03-27 PROCEDURE — 4010F ACE/ARB THERAPY RXD/TAKEN: CPT | Mod: CPTII,S$GLB,, | Performed by: FAMILY MEDICINE

## 2024-03-27 PROCEDURE — 3008F BODY MASS INDEX DOCD: CPT | Mod: CPTII,S$GLB,, | Performed by: FAMILY MEDICINE

## 2024-03-27 RX ORDER — MEDROXYPROGESTERONE ACETATE 10 MG/1
TABLET ORAL
COMMUNITY
End: 2024-04-24

## 2024-03-27 RX ORDER — ALBUTEROL SULFATE 90 UG/1
AEROSOL, METERED RESPIRATORY (INHALATION)
COMMUNITY
End: 2024-05-08

## 2024-03-27 RX ORDER — IBUPROFEN 200 MG
TABLET ORAL
COMMUNITY

## 2024-03-27 NOTE — PROGRESS NOTES
Subjective:       Patient ID: Valerie Pérez is a 44 y.o. female.    Chief Complaint: No chief complaint on file.      HPI: 44-year-old female patient presenting to the clinic to establish care for urinary urge incontinence.  Patient complains of the sudden urge to urinate in the inability to make it to the restroom.  The patient goes through 3 adult diapers at night and 3 adult pads during the day.  She denies any symptoms of stress incontinence.  She denies any symptoms of infection.         Past Medical History:   Past Medical History:   Diagnosis Date    Anxiety     Depression     Esophageal stricture     Former smoker     GERD (gastroesophageal reflux disease)     Hiatal hernia     Hyperlipidemia     Hypertension     Morbid obesity     Sleep apnea, unspecified     SOB (shortness of breath) on exertion     Thyroid disease     hypothyroidism    Vitamin D deficiency        Past Surgical Historical:   Past Surgical History:   Procedure Laterality Date     SECTION      x 2    right femur Right     TUBAL LIGATION          Medications:   Medication List with Changes/Refills   Current Medications    ALBUTEROL (PROVENTIL/VENTOLIN HFA) 90 MCG/ACTUATION INHALER    INHALE 2 PUFFS BY MOUTH EVERY 4 HOURS AS NEEDED FOR WHEEZING AND SHORTNESS OF BREATH    DEXTROAMPHETAMINE-AMPHETAMINE (ADDERALL) 15 MG TABLET    Take 15 mg by mouth 2 (two) times daily.    FLUVOXAMINE (LUVOX) 50 MG TAB TABLET    Take 50 mg by mouth every evening.    IBUPROFEN (ADVIL,MOTRIN) 800 MG TABLET    TAKE 1 TABLET BY MOUTH EVERY 8 HOURS WITH FOOD AS NEEDED FOR PAIN. MAY TAKE WITH TYLENOL    IMVEXXY MAINTENANCE PACK 4 MCG INST    Place 1 suppository vaginally twice a week.    LEVOTHYROXINE (SYNTHROID) 75 MCG TABLET    Take 75 mcg by mouth.    LISINOPRIL (PRINIVIL,ZESTRIL) 5 MG TABLET    Take 5 mg by mouth once daily.    MEDROXYPROGESTERONE (PROVERA) 10 MG TABLET    Take 1 tablet every day by oral route for 10 days.    NICOTINE (NICODERM CQ) 14 MG/24  HR        PANTOPRAZOLE (PROTONIX) 40 MG TABLET    Take 40 mg by mouth once daily.    ROSUVASTATIN (CRESTOR) 10 MG TABLET    Take 10 mg by mouth every evening.        Past Social History:   Social History     Socioeconomic History    Marital status: Single   Tobacco Use    Smoking status: Every Day     Types: Cigarettes     Start date: 2/6/2023    Smokeless tobacco: Never   Substance and Sexual Activity    Alcohol use: Not Currently    Drug use: Never    Sexual activity: Yes     Partners: Male     Birth control/protection: None       Allergies: Review of patient's allergies indicates:  No Known Allergies     Family History:   Family History   Problem Relation Age of Onset    Diabetes Mother         Review of Systems:  Review of Systems   Constitutional:  Negative for activity change, appetite change, chills, diaphoresis, fatigue, fever and unexpected weight change.   HENT:  Negative for congestion, dental problem, drooling, ear discharge, ear pain, facial swelling, hearing loss, mouth sores, nosebleeds, postnasal drip, rhinorrhea, sinus pressure, sinus pain, sneezing, sore throat, tinnitus, trouble swallowing and voice change.    Eyes:  Negative for photophobia, pain, discharge, redness, itching and visual disturbance.   Respiratory:  Negative for apnea, cough, choking, chest tightness, shortness of breath, wheezing and stridor.    Cardiovascular:  Negative for chest pain and leg swelling.   Gastrointestinal:  Negative for abdominal distention, abdominal pain, anal bleeding, blood in stool, constipation, diarrhea, nausea, rectal pain and vomiting.   Endocrine: Negative for cold intolerance, heat intolerance, polydipsia, polyphagia and polyuria.   Genitourinary:  Positive for frequency and urgency. Negative for decreased urine volume, difficulty urinating, dyspareunia, dysuria, enuresis, flank pain, genital sores, hematuria, menstrual problem, pelvic pain, vaginal bleeding, vaginal discharge and vaginal pain.    Musculoskeletal:  Negative for arthralgias, back pain, gait problem, joint swelling, myalgias, neck pain and neck stiffness.   Skin:  Negative for color change, pallor, rash and wound.   Allergic/Immunologic: Negative for environmental allergies, food allergies and immunocompromised state.   Neurological:  Negative for dizziness, tremors, seizures, syncope, facial asymmetry, speech difficulty, weakness, light-headedness, numbness and headaches.   Hematological:  Negative for adenopathy. Does not bruise/bleed easily.   Psychiatric/Behavioral:  Negative for agitation, behavioral problems, confusion, decreased concentration, dysphoric mood, hallucinations, self-injury, sleep disturbance and suicidal ideas. The patient is not nervous/anxious and is not hyperactive.        Physical Exam:  Physical Exam  Exam conducted with a chaperone present.   Constitutional:       Appearance: Normal appearance.   HENT:      Head: Normocephalic.      Nose: Nose normal.      Mouth/Throat:      Mouth: Mucous membranes are moist.      Pharynx: Oropharynx is clear.   Eyes:      Extraocular Movements: Extraocular movements intact.      Conjunctiva/sclera: Conjunctivae normal.      Pupils: Pupils are equal, round, and reactive to light.   Cardiovascular:      Rate and Rhythm: Normal rate and regular rhythm.      Pulses: Normal pulses.      Heart sounds: Normal heart sounds.   Pulmonary:      Effort: Pulmonary effort is normal.      Breath sounds: Normal breath sounds.   Abdominal:      General: Abdomen is flat. Bowel sounds are normal.      Palpations: Abdomen is soft.      Hernia: There is no hernia in the left inguinal area or right inguinal area.   Genitourinary:     General: Normal vulva.      Pubic Area: No rash or pubic lice.       Labia:         Right: No rash, tenderness, lesion or injury.         Left: No rash, tenderness, lesion or injury.       Urethra: No prolapse, urethral pain, urethral swelling or urethral lesion.       Rectum: Normal.   Musculoskeletal:         General: Normal range of motion.      Cervical back: Normal range of motion and neck supple.   Lymphadenopathy:      Lower Body: No right inguinal adenopathy. No left inguinal adenopathy.   Skin:     General: Skin is warm and dry.      Capillary Refill: Capillary refill takes less than 2 seconds.   Neurological:      General: No focal deficit present.      Mental Status: She is alert and oriented to person, place, and time. Mental status is at baseline.   Psychiatric:         Mood and Affect: Mood normal.         Behavior: Behavior normal.         Thought Content: Thought content normal.         Judgment: Judgment normal.         Assessment/Plan:     Urinary urge incontinence:  Urinalysis negative for infection today.  We had a long discussion about the different types of urinary incontinence.  Patient denies any stress incontinence symptoms.  Instructed the patient to decrease fluid intake throughout the day.  Discontinue oral intake 4 hours prior to bed.  Avoid caffeine and carbonated beverages and practice timed voiding.  Patient provided with 4 weeks of Myrbetriq 50 mg.  We will see the patient back in 1 month to determine if this medication is improving her symptoms we did discuss possible Botox if oral medication does not work.  We we will avoid anticholinergics due to hypertension. PVR today 6 mL  Problem List Items Addressed This Visit    None  Visit Diagnoses       Urinary urgency        Nocturia

## 2024-03-28 DIAGNOSIS — R23.2 HOT FLASHES: Primary | ICD-10-CM

## 2024-03-28 DIAGNOSIS — R68.82 LOW LIBIDO: ICD-10-CM

## 2024-03-28 RX ORDER — TESTOSTERONE
POWDER (GRAM) MISCELLANEOUS
Qty: 30 G | Refills: 5 | Status: SHIPPED | OUTPATIENT
Start: 2024-03-28

## 2024-03-28 RX ORDER — FEZOLINETANT 45 MG/1
45 TABLET, FILM COATED ORAL DAILY
Qty: 30 TABLET | Refills: 11 | Status: SHIPPED | OUTPATIENT
Start: 2024-03-28 | End: 2024-05-08

## 2024-04-06 ENCOUNTER — OFFICE VISIT (OUTPATIENT)
Dept: URGENT CARE | Facility: CLINIC | Age: 44
End: 2024-04-06
Payer: COMMERCIAL

## 2024-04-06 VITALS
WEIGHT: 293 LBS | DIASTOLIC BLOOD PRESSURE: 76 MMHG | SYSTOLIC BLOOD PRESSURE: 141 MMHG | TEMPERATURE: 99 F | OXYGEN SATURATION: 96 % | HEIGHT: 63 IN | HEART RATE: 92 BPM | RESPIRATION RATE: 18 BRPM | BODY MASS INDEX: 51.91 KG/M2

## 2024-04-06 DIAGNOSIS — R05.1 ACUTE COUGH: ICD-10-CM

## 2024-04-06 DIAGNOSIS — J06.9 UPPER RESPIRATORY TRACT INFECTION, UNSPECIFIED TYPE: Primary | ICD-10-CM

## 2024-04-06 DIAGNOSIS — R52 BODY ACHES: ICD-10-CM

## 2024-04-06 LAB
CTP QC/QA: YES
CTP QC/QA: YES
POC MOLECULAR INFLUENZA A AGN: NEGATIVE
POC MOLECULAR INFLUENZA B AGN: NEGATIVE
SARS-COV-2 AG RESP QL IA.RAPID: NEGATIVE

## 2024-04-06 PROCEDURE — 87811 SARS-COV-2 COVID19 W/OPTIC: CPT | Mod: QW,S$GLB,, | Performed by: STUDENT IN AN ORGANIZED HEALTH CARE EDUCATION/TRAINING PROGRAM

## 2024-04-06 PROCEDURE — 87502 INFLUENZA DNA AMP PROBE: CPT | Mod: QW,,, | Performed by: STUDENT IN AN ORGANIZED HEALTH CARE EDUCATION/TRAINING PROGRAM

## 2024-04-06 PROCEDURE — 99203 OFFICE O/P NEW LOW 30 MIN: CPT | Mod: S$GLB,,, | Performed by: STUDENT IN AN ORGANIZED HEALTH CARE EDUCATION/TRAINING PROGRAM

## 2024-04-06 RX ORDER — LISINOPRIL 5 MG/1
1 TABLET ORAL DAILY
COMMUNITY
End: 2024-06-13 | Stop reason: SDUPTHER

## 2024-04-06 NOTE — LETTER
April 6, 2024      [facility] [place] [facility]  [facility] [facility] [place] [address]  Phone: [phone]  Fax: [phone]       Patient: Valerie Pérez   YOB: 1980  Date of Visit: 04/06/2024    To Whom It May Concern:    Dayne Pérez  was at [facility] on 04/06/2024. The patient may return to work/school on 4/8/24 with no restrictions. If you have any questions or concerns, or if I can be of further assistance, please do not hesitate to contact me.    Sincerely,    Godwin Larry MD

## 2024-04-06 NOTE — PROGRESS NOTES
"Subjective:      Patient ID: Valerie Pérez is a 44 y.o. female.    Vitals:  height is 5' 2.5" (1.588 m) and weight is 133.8 kg (295 lb). Her oral temperature is 98.6 °F (37 °C). Her blood pressure is 141/76 (abnormal) and her pulse is 92. Her respiration is 18 and oxygen saturation is 96%.     Chief Complaint: flu like symptoms    Pt presents with flu like symptoms since yesterday  Needs excuse for work  No OTC meds. Denied sick contact.     Other  This is a new problem. The current episode started yesterday. The problem occurs constantly. The problem has been unchanged. Associated symptoms include congestion, coughing, myalgias and a sore throat. Pertinent negatives include no chest pain or fever.       Constitution: Negative for fever.   HENT:  Positive for congestion and sore throat.    Cardiovascular:  Negative for chest pain.   Respiratory:  Positive for cough. Negative for shortness of breath.    Musculoskeletal:  Positive for muscle ache.      Objective:     Physical Exam   HENT:   Head: Normocephalic and atraumatic.   Ears:   Right Ear: impacted cerumen  Left Ear: impacted cerumen  Nose: Nose normal.   Mouth/Throat: Mucous membranes are moist. Oropharynx is clear.   Eyes: Conjunctivae are normal. Pupils are equal, round, and reactive to light.   Cardiovascular: Normal rate, regular rhythm and normal heart sounds.   Pulmonary/Chest: Effort normal and breath sounds normal.   Abdominal: Normal appearance.   Lymphadenopathy:        Head (right side): No submental, no submandibular, no tonsillar, no preauricular, no posterior auricular and no occipital adenopathy present.        Head (left side): No submental, no submandibular, no tonsillar, no preauricular, no posterior auricular and no occipital adenopathy present.   Neurological: She is alert.   Psychiatric: Her behavior is normal. Mood normal.     Results for orders placed or performed in visit on 04/06/24   POCT Influenza A/B MOLECULAR   Result Value Ref Range "    POC Molecular Influenza A Ag Negative Negative, Not Reported    POC Molecular Influenza B Ag Negative Negative, Not Reported     Acceptable Yes    SARS Coronavirus 2 Antigen, POCT Manual Read   Result Value Ref Range    SARS Coronavirus 2 Antigen Negative Negative     Acceptable Yes       Assessment:     1. Upper respiratory tract infection, unspecified type    2. Acute cough    3. Body aches        Plan:     -patient declined any medications being sent to the [facility]  Upper respiratory tract infection, unspecified type  -     POCT Influenza A/B MOLECULAR  -     SARS Coronavirus 2 Antigen, POCT Manual Read    Acute cough    Body aches        General Instructions for Upper Respiratory Infection (URI):     Alternate Tylenol and Ibuprofen every 3 hrs for fever, pain and inflammation.   Avoid NSAIDs (Ibuprofen, Aleve, Motrin, Aspirin) if you are pregnant, or have advanced kidney disease or history of stomach ulcers/bleeding.     Sore throat/Post Nasal Drip:  Salt water gargles, chloraseptic spray, lozenges, or cough drops   Honey/lemon water or warm tea   Cepachol   Zantac will help if there is reflux from the post nasal drip and helpful to take at night     Sinus Congestion/Runny nose:  Zyrtec/Claritin/Allegra during the day and Benadryl at night as needed  Mucinex, Dayquil, or Coricidin   If you DO NOT have Hypertension (high blood pressure) or any history of palpitations, it is ok to take over the counter Sudafed or Mucinex D or Allegra-D or Claritin-D or Zyrtec-D.  If you do take one of the above, it is ok to combine that with plain over the counter Mucinex or Allegra or Claritin or Zyrtec. If, for example, you are taking Zyrtec -D, you can combine that with Mucinex, but not Mucinex-D. If you are taking Mucinex-D, you can combine that with plain Allegra or Claritin or Zyrtec.  If you DO have Hypertension or palpitations, it is safe to take Coricidin HBP for relief of sinus  symptoms.  Nasal saline spray reduces inflammation and dryness  Flonase OTC or Nasacort OTC to help decrease inflammation in nasal turbinates and allow sinuses to drain  Warm face compresses/hot showers as often as you can to open sinuses and allow to drain.   Vicks vapor rub and/or humidifier at night  Cold-eeze helps to reduce the duration of URI symptoms if taken early  Elderberry, Emergen-C, and/or Zinc to reduce duration of viral URI symptoms    Cough:  Robitussin or Delsym as needed  Cough drops  Vicks vapor rub and/or humidifier at night       Rest as much as you can     Your symptoms are likely viral and will typically last 7-10 days, maybe longer depending on how it affects your body.  You are contagious until day 5-7, so minimize contact with others to reduce the spread to others and stay home from work or school as we discussed. Dehydration is preventable but is one of the main reasons why you will feel so badly. Drink pedialyte, gatorade or propel. Stay hydrated.  Antibiotics are not needed unless a complication( such as Otitis Media, Bacterial sinus infection or pneumonia) develops. Taking antibiotics for Flu/Cold is not supported by evidence-based medicine and can expose you to unnecessary side effects of the medication, such as anaphylaxis, yeast infection and leads to antibiotic resistance.     Please follow up with your primary care provider within 5-7 days if your signs and symptoms have not resolved or worsen.  If your condition worsens or fails to improve we recommend that you receive another evaluation at the emergency  room immediately or contact your primary medical clinic to discuss your concerns.  You must understand that you have received an Urgent Care treatment only and that you may be released before all of  your medical problems are known or treated. You, the patient, will arrange for follow up care as instructed.    Go to Emergency Room immediately if you experience any:  Chest pain,  shortness of breath, wheezing or difficulty breathing,  Severe headache, face, neck or ear pain,  New rash,  Fever over 101.5º F (38.6 C) for more than three days,  Confusion, behavior change or seizure,  Medical Decision Making:   Differential Diagnosis:   URI  Clinical Tests:   Lab Tests: Ordered and Reviewed       <> Summary of Lab: Covid and flu neg  Urgent Care Management:  Pt presents with flu like symptoms since yesterday  Needs excuse for work  No OTC meds. Denied sick contact.   Physical Exam   HENT:   Head: Normocephalic and atraumatic.   Ears:   Right Ear: impacted cerumen  Left Ear: impacted cerumen  Nose: Nose normal.   Mouth/Throat: Mucous membranes are moist. Oropharynx is clear.   Eyes: Conjunctivae are normal. Pupils are equal, round, and reactive to light.   Cardiovascular: Normal rate, regular rhythm and normal heart sounds.   Pulmonary/Chest: Effort normal and breath sounds normal.   Abdominal: Normal appearance.   Lymphadenopathy:        Head (right side): No submental, no submandibular, no tonsillar, no preauricular, no posterior auricular and no occipital adenopathy present.        Head (left side): No submental, no submandibular, no tonsillar, no preauricular, no posterior auricular and no occipital adenopathy present.   Neurological: She is alert.   Psychiatric: Her behavior is normal. Mood normal.   There pt declined any medications. ED and return precautions given. The pt doug.

## 2024-04-06 NOTE — LETTER
April 6, 2024      [facility] [place] [facility]  [facility] [facility] [place] [address]  Phone: [phone]  Fax: [phone]       Patient: Valerie Pérez   YOB: 1980  Date of Visit: 04/06/2024    To Whom It May Concern:    Dayne Pérez  was at [facility] on 04/06/2024. The patient may return to work/school on 4/7/24 with no restrictions. If you have any questions or concerns, or if I can be of further assistance, please do not hesitate to contact me.    Sincerely,    Godwin Larry MD

## 2024-04-17 ENCOUNTER — PROCEDURE VISIT (OUTPATIENT)
Dept: OBSTETRICS AND GYNECOLOGY | Facility: CLINIC | Age: 44
End: 2024-04-17
Payer: COMMERCIAL

## 2024-04-17 DIAGNOSIS — N95.1 MENOPAUSE SYNDROME: ICD-10-CM

## 2024-04-17 DIAGNOSIS — N92.0 MENORRHAGIA WITH REGULAR CYCLE: ICD-10-CM

## 2024-04-17 LAB
CHOLEST SERPL-MSCNC: 147 MG/DL (ref 100–200)
HBA1C MFR BLD: 7.2 % (ref 4–6)
HDLC SERPL-MCNC: 37 MG/DL
LDL/HDL RATIO: 2.4 (ref 1–3)
LDLC SERPL CALC-MCNC: 88 MG/DL (ref 0–100)
TRIGL SERPL-MCNC: 110 MG/DL (ref 0–150)

## 2024-04-17 PROCEDURE — 76830 TRANSVAGINAL US NON-OB: CPT | Mod: S$GLB,,, | Performed by: OBSTETRICS & GYNECOLOGY

## 2024-04-24 DIAGNOSIS — N92.0 MENORRHAGIA WITH REGULAR CYCLE: Primary | ICD-10-CM

## 2024-04-24 RX ORDER — NORETHINDRONE 0.35 MG/1
1 TABLET ORAL DAILY
Qty: 30 TABLET | Refills: 11 | Status: SHIPPED | OUTPATIENT
Start: 2024-04-24 | End: 2025-04-24

## 2024-05-08 ENCOUNTER — OFFICE VISIT (OUTPATIENT)
Dept: OBSTETRICS AND GYNECOLOGY | Facility: CLINIC | Age: 44
End: 2024-05-08
Payer: COMMERCIAL

## 2024-05-08 VITALS
WEIGHT: 293 LBS | DIASTOLIC BLOOD PRESSURE: 84 MMHG | HEART RATE: 76 BPM | SYSTOLIC BLOOD PRESSURE: 145 MMHG | BODY MASS INDEX: 53.06 KG/M2

## 2024-05-08 DIAGNOSIS — Z01.419 WELL WOMAN EXAM WITH ROUTINE GYNECOLOGICAL EXAM: Primary | ICD-10-CM

## 2024-05-08 DIAGNOSIS — Z12.31 SCREENING MAMMOGRAM FOR BREAST CANCER: ICD-10-CM

## 2024-05-08 DIAGNOSIS — Z51.81 THERAPEUTIC DRUG MONITORING: ICD-10-CM

## 2024-05-08 DIAGNOSIS — N95.1 HOT FLASHES DUE TO MENOPAUSE: ICD-10-CM

## 2024-05-08 DIAGNOSIS — Z71.6 ENCOUNTER FOR SMOKING CESSATION COUNSELING: ICD-10-CM

## 2024-05-08 PROCEDURE — 99459 PELVIC EXAMINATION: CPT | Mod: S$GLB,,,

## 2024-05-08 PROCEDURE — 3079F DIAST BP 80-89 MM HG: CPT | Mod: CPTII,S$GLB,,

## 2024-05-08 PROCEDURE — 3008F BODY MASS INDEX DOCD: CPT | Mod: CPTII,S$GLB,,

## 2024-05-08 PROCEDURE — 3077F SYST BP >= 140 MM HG: CPT | Mod: CPTII,S$GLB,,

## 2024-05-08 PROCEDURE — 4010F ACE/ARB THERAPY RXD/TAKEN: CPT | Mod: CPTII,S$GLB,,

## 2024-05-08 PROCEDURE — 1159F MED LIST DOCD IN RCRD: CPT | Mod: CPTII,S$GLB,,

## 2024-05-08 PROCEDURE — 99396 PREV VISIT EST AGE 40-64: CPT | Mod: S$GLB,,,

## 2024-05-08 PROCEDURE — 1160F RVW MEDS BY RX/DR IN RCRD: CPT | Mod: CPTII,S$GLB,,

## 2024-05-08 RX ORDER — HYDROXYZINE PAMOATE 25 MG/1
CAPSULE ORAL
COMMUNITY
Start: 2024-04-16

## 2024-05-08 RX ORDER — FEZOLINETANT 45 MG/1
45 TABLET, FILM COATED ORAL DAILY
Qty: 30 TABLET | Refills: 11 | Status: SHIPPED | OUTPATIENT
Start: 2024-05-08

## 2024-05-08 NOTE — PROGRESS NOTES
Subjective:      Patient ID: Valerie Pérez is a 44 y.o. female who presents for evaluation today.    Chief Complaint:    Well Woman      History of Present Illness  HPI  Annual Exam (Premenopausal) - Patient presents for annual exam. The patient has no complaints today. The patient is sexually active. GYN screening history: last pap: was normal and last mammogram: was normal. The patient wears seatbelts: yes. The patient participates in regular exercise: no. Has the patient ever been transfused or tattooed?: not asked. The patient reports that there is not domestic violence in her life. She tried veozah for a week or so, reported minimal change in hot flashes and did not get her rx, she would like to try again. She has just started her micronor Monday to help with her cycle control. She has been using testosterone cream sparingly and does notice improvement in her energy. She has not used imvexxy as much to notice a difference vaginally as of now. She will be seeing Dr. Peraza next week for her primary care.     GYN History  Patient's last menstrual period was 2024 (exact date).   Date of Last Pap: Pap smear completed today with HPV co-testing    VITALS  BP (!) 145/84   Pulse 76   Wt 133.7 kg (294 lb 12.8 oz)   LMP 2024 (Exact Date)   BMI 53.06 kg/m²   Weight: 133.7 kg (294 lb 12.8 oz)         PAST MEDICAL HISTORY  Past Medical History:   Diagnosis Date    Anxiety     Depression     Esophageal stricture     Former smoker     GERD (gastroesophageal reflux disease)     Hiatal hernia     Hyperlipidemia     Hypertension     Morbid obesity     Sleep apnea, unspecified     SOB (shortness of breath) on exertion     Thyroid disease     hypothyroidism    Vitamin D deficiency        PAST SURGICAL HISTORY  Past Surgical History:   Procedure Laterality Date     SECTION      x 2    ENDOMETRIAL ABLATION      w/ [place]    right femur Right     TUBAL LIGATION         SOCIAL HISTORY  Social History      Tobacco Use   Smoking Status Every Day    Types: Cigarettes    Start date: 2/6/2023    Passive exposure: Never   Smokeless Tobacco Never   ,   Social History     Substance and Sexual Activity   Alcohol Use Not Currently        MEDICATIONS  Outpatient Medications Marked as Taking for the 5/8/24 encounter (Office Visit) with Christina Aiken NP   Medication Sig Dispense Refill    hydrOXYzine pamoate (VISTARIL) 25 MG Cap TAKE 2 CAPSULES BY MOUTH DAILY AS NEEDED      ibuprofen (ADVIL,MOTRIN) 800 MG tablet TAKE 1 TABLET BY MOUTH EVERY 8 HOURS WITH FOOD AS NEEDED FOR PAIN. MAY TAKE WITH TYLENOL      IMVEXXY MAINTENANCE PACK 4 mcg Inst Place 1 suppository vaginally twice a week. 24 each 3    levothyroxine (SYNTHROID) 75 MCG tablet Take 75 mcg by mouth.      lisinopriL (PRINIVIL,ZESTRIL) 5 MG tablet Take 1 tablet by mouth once daily.      nicotine (NICODERM CQ) 14 mg/24 hr       norethindrone (MICRONOR) 0.35 mg tablet Take 1 tablet (0.35 mg total) by mouth once daily. 30 tablet 11    pantoprazole (PROTONIX) 40 MG tablet Take 40 mg by mouth once daily.      rosuvastatin (CRESTOR) 10 MG tablet Take 10 mg by mouth every evening.      testosterone, bulk, Powd COMPOUNDED TESTOSTERONE CREAM 3MG/ML APPLY 1ML TOPICALLY DAILY AS DIRECTED #30GM WITH 5RF 30 g 5         Review of Systems   Review of Systems   Constitutional:  Negative for activity change.   Eyes:  Negative for visual disturbance.   Respiratory:  Negative for shortness of breath.    Cardiovascular:  Negative for chest pain.   Gastrointestinal:  Negative for abdominal pain.   Genitourinary:  Negative for vaginal bleeding.        No abnormal vaginal bleeding   Musculoskeletal:  Negative for back pain.   Integumentary:  Negative for rash and breast mass.   Neurological:  Negative for numbness.   Psychiatric/Behavioral:          No mood disturbance or changes    Breast: Negative for mass          Objective:     Physical Exam:   Constitutional: She is oriented to  person, place, and time. She appears well-developed. She is cooperative.    HENT:   Head: Normocephalic.     Neck: Trachea normal. No thyromegaly present.    Cardiovascular:  Normal rate, regular rhythm and normal heart sounds.             Pulmonary/Chest: Effort normal and breath sounds normal. Right breast exhibits no mass, no nipple discharge and no skin change. Left breast exhibits no mass, no nipple discharge and no skin change.        Abdominal: Soft. There is no abdominal tenderness. There is no rebound and no guarding.     Genitourinary:    Vagina and uterus normal.      Pelvic exam was performed with patient supine.     Labial bartholins normal.There is no lesion on the right labia. There is no lesion on the left labia. Cervix is normal. Right adnexum displays no mass and no tenderness. Left adnexum displays no mass and no tenderness. Cervix exhibits no discharge. Uterus is not enlarged and not tender.              Lymphadenopathy:        Head (right side): No submental and no submandibular adenopathy present.        Head (left side): No submental and no submandibular adenopathy present.     She has no cervical adenopathy.    Neurological: She is alert and oriented to person, place, and time.    Skin: Skin is warm.    Psychiatric: She has a normal mood and affect. Her speech is normal and behavior is normal. Thought content normal.          Assessment:        1. Well woman exam with routine gynecological exam    2. Screening mammogram for breast cancer    3. Hot flashes due to menopause    4. Encounter for smoking cessation counseling       Well woman exam with routine gynecological exam  -     Liquid-based pap smear, screening    Screening mammogram for breast cancer  -     Mammo Digital Screening Bilat; Future; Expected date: 05/08/2024    Hot flashes due to menopause  -     fezolinetant (VEOZAH) 45 mg Tab; Take 45 mg by mouth once daily.  Dispense: 30 tablet; Refill: 11    Encounter for smoking cessation  counseling  -     Ambulatory referral/consult to Smoking Cessation Program; Future; Expected date: 05/15/2024    Therapeutic drug monitoring  -     Comprehensive Metabolic Panel; Future; Expected date: 05/08/2024         Plan:     Patient instructed to contact the clinic should any questions or concerns arise prior to her next office visit. Patient is happy with the plan of care at this time, verbalizes understanding and denies outstanding questions.      Pap  Mammogram  Self-breast exams  Will re-send veozah, f/u in 3 mos med check & liver enzymes  Consider annual health panel with Primary Care if not done  If you don't hear from the office regarding results within 1 week, please call  Follow up in 1 year for annual or sooner as needed  Chaperone present for exam

## 2024-05-10 DIAGNOSIS — N83.201 RIGHT OVARIAN CYST: Primary | ICD-10-CM

## 2024-05-10 LAB — Lab: NORMAL

## 2024-05-13 ENCOUNTER — PATIENT MESSAGE (OUTPATIENT)
Dept: OBSTETRICS AND GYNECOLOGY | Facility: CLINIC | Age: 44
End: 2024-05-13
Payer: COMMERCIAL

## 2024-05-14 ENCOUNTER — PATIENT MESSAGE (OUTPATIENT)
Dept: OBSTETRICS AND GYNECOLOGY | Facility: CLINIC | Age: 44
End: 2024-05-14
Payer: COMMERCIAL

## 2024-05-14 ENCOUNTER — TELEPHONE (OUTPATIENT)
Dept: SMOKING CESSATION | Facility: CLINIC | Age: 44
End: 2024-05-14
Payer: COMMERCIAL

## 2024-05-14 NOTE — TELEPHONE ENCOUNTER
Attempted to contact patient regarding missed [facility] appointment.  Patient did not answer.  Counselor left a voice message with name and contact information.

## 2024-05-15 ENCOUNTER — OFFICE VISIT (OUTPATIENT)
Dept: PRIMARY CARE CLINIC | Facility: CLINIC | Age: 44
End: 2024-05-15
Payer: COMMERCIAL

## 2024-05-15 VITALS
TEMPERATURE: 98 F | HEART RATE: 102 BPM | RESPIRATION RATE: 16 BRPM | BODY MASS INDEX: 51.91 KG/M2 | SYSTOLIC BLOOD PRESSURE: 122 MMHG | WEIGHT: 293 LBS | HEIGHT: 63 IN | OXYGEN SATURATION: 98 % | DIASTOLIC BLOOD PRESSURE: 83 MMHG

## 2024-05-15 DIAGNOSIS — Z76.89 ENCOUNTER TO ESTABLISH CARE: ICD-10-CM

## 2024-05-15 DIAGNOSIS — E78.2 MIXED HYPERLIPIDEMIA: ICD-10-CM

## 2024-05-15 DIAGNOSIS — Z00.00 ROUTINE GENERAL MEDICAL EXAMINATION AT A HEALTH CARE FACILITY: ICD-10-CM

## 2024-05-15 DIAGNOSIS — R73.01 IMPAIRED FASTING GLUCOSE: Primary | ICD-10-CM

## 2024-05-15 DIAGNOSIS — I10 BENIGN ESSENTIAL HTN: ICD-10-CM

## 2024-05-15 DIAGNOSIS — E66.01 MORBID OBESITY WITH BMI OF 50.0-59.9, ADULT: ICD-10-CM

## 2024-05-15 DIAGNOSIS — E03.9 HYPOTHYROIDISM, UNSPECIFIED TYPE: ICD-10-CM

## 2024-05-15 PROBLEM — G47.33 OBSTRUCTIVE SLEEP APNEA SYNDROME: Status: ACTIVE | Noted: 2024-05-15

## 2024-05-15 PROBLEM — F90.9 ATTENTION DEFICIT HYPERACTIVITY DISORDER: Status: ACTIVE | Noted: 2024-05-15

## 2024-05-15 PROBLEM — E78.5 HYPERLIPIDEMIA: Status: ACTIVE | Noted: 2024-05-15

## 2024-05-15 LAB — GLUCOSE SERPL-MCNC: 216 MG/DL (ref 70–110)

## 2024-05-15 PROCEDURE — 1159F MED LIST DOCD IN RCRD: CPT | Mod: CPTII,S$GLB,, | Performed by: INTERNAL MEDICINE

## 2024-05-15 PROCEDURE — 3008F BODY MASS INDEX DOCD: CPT | Mod: CPTII,S$GLB,, | Performed by: INTERNAL MEDICINE

## 2024-05-15 PROCEDURE — 3074F SYST BP LT 130 MM HG: CPT | Mod: CPTII,S$GLB,, | Performed by: INTERNAL MEDICINE

## 2024-05-15 PROCEDURE — 4010F ACE/ARB THERAPY RXD/TAKEN: CPT | Mod: CPTII,S$GLB,, | Performed by: INTERNAL MEDICINE

## 2024-05-15 PROCEDURE — 1160F RVW MEDS BY RX/DR IN RCRD: CPT | Mod: CPTII,S$GLB,, | Performed by: INTERNAL MEDICINE

## 2024-05-15 PROCEDURE — 99396 PREV VISIT EST AGE 40-64: CPT | Mod: S$GLB,,, | Performed by: INTERNAL MEDICINE

## 2024-05-15 PROCEDURE — 82962 GLUCOSE BLOOD TEST: CPT | Mod: ,,, | Performed by: INTERNAL MEDICINE

## 2024-05-15 PROCEDURE — 3079F DIAST BP 80-89 MM HG: CPT | Mod: CPTII,S$GLB,, | Performed by: INTERNAL MEDICINE

## 2024-05-15 RX ORDER — LAMOTRIGINE 25 MG/1
25 TABLET ORAL NIGHTLY
COMMUNITY
Start: 2024-05-01

## 2024-05-15 RX ORDER — METFORMIN HYDROCHLORIDE 500 MG/1
500 TABLET ORAL 2 TIMES DAILY WITH MEALS
Qty: 180 TABLET | Refills: 3 | Status: SHIPPED | OUTPATIENT
Start: 2024-05-15 | End: 2025-05-15

## 2024-05-15 NOTE — PROGRESS NOTES
Subjective:      Patient ID: Valerie Pérez is a 44 y.o. female.    Chief Complaint: Fatigue (C/o fatigue, onset 3 months prior) and Knee Pain (C/o bilateral knee pain, seeing Dr. Lopez w/ [facility])    :  Patient has bipolar disorder and is being followed by Psychiatry/ Abran Robles . Patient reports symptoms are under ok control.     With multiple medical problem including obstructive sleep apnea and is on CPAP machine.  Patient reports compliance with CPAP machine and reports using it more than 4 hours every night.  Patient also has morbid obesity with BMI more than 53.  Patient has gained more than 30-40 lb in last 1 year secondary to knee pain and decreased activity + also patient quit smoking and that might be a contributing factor as well.    Patient has bilateral knee pain and probably osteoarthritis involving bilateral knees.  Patient is being followed by pain management Dr. Lopez.  Patient is also being followed by Orthopedic[facility] center and receiving intra-articular steroid injection with little help.  Patient has hypothyroidism and is on levothyroxine 75 mcg.  Last TSH was at goal.  Patient complains of fatigue for last few months.  Patient reports previous history of diabetes/insulin resistance and was on metformin.  The medication was stopped 2 years ago.  Patient last CMP suggest fasting blood sugar of 144 which is high.  Patient reports polyuria polydipsia and occasional numbness in right foot.     Patient has hyperlipidemia and is on rosuvastatin 10 mg.  Lipid profile is not available.         Review of Systems   Constitutional:  Negative for chills, diaphoresis, fever, malaise/fatigue and weight loss (30 lbs weight gain in a year.).   HENT:  Negative for congestion, ear pain, sinus pain, sore throat and tinnitus.    Eyes:  Negative for blurred vision and photophobia.   Respiratory:  Negative for cough, hemoptysis, shortness of breath and wheezing.    Cardiovascular:  Negative for chest pain,  "palpitations, orthopnea, leg swelling and PND.   Gastrointestinal:  Negative for abdominal pain, blood in stool, constipation, diarrhea, heartburn, melena, nausea and vomiting.   Genitourinary:  Positive for frequency. Negative for dysuria and urgency.   Musculoskeletal:  Positive for joint pain. Negative for back pain, myalgias and neck pain.   Skin:  Negative for rash.   Neurological:  Negative for dizziness, tremors, seizures, loss of consciousness and weakness.   Endo/Heme/Allergies:  Positive for polydipsia.   Psychiatric/Behavioral:  Negative for depression and hallucinations. The patient does not have insomnia.      Objective:   /83 (BP Location: Left arm, Patient Position: Sitting, BP Method: Large (Automatic))   Pulse 102   Temp 98.2 °F (36.8 °C) (Oral)   Resp 16   Ht 5' 2.5" (1.588 m)   Wt 134.3 kg (296 lb)   LMP 04/25/2024 (Exact Date)   SpO2 98%   BMI 53.28 kg/m²     Physical Exam  Constitutional:       General: She is not in acute distress.     Appearance: She is obese. She is not diaphoretic.   Neck:      Thyroid: No thyromegaly.   Cardiovascular:      Rate and Rhythm: Normal rate and regular rhythm.      Heart sounds: Normal heart sounds. No murmur heard.  Pulmonary:      Effort: Pulmonary effort is normal. No respiratory distress.      Breath sounds: Normal breath sounds. No wheezing.   Abdominal:      General: Bowel sounds are normal. There is no distension.      Palpations: Abdomen is soft.      Tenderness: There is no abdominal tenderness.   Lymphadenopathy:      Cervical: No cervical adenopathy.   Neurological:      Mental Status: She is alert and oriented to person, place, and time.   Psychiatric:         Behavior: Behavior normal.         Thought Content: Thought content normal.         Judgment: Judgment normal.   Assessment:       ICD-10-CM ICD-9-CM   1. Benign essential HTN  I10 401.1   2. Encounter to establish care  Z76.89 V65.8   3. Hypothyroidism, unspecified type  E03.9 " 244.9   4. Morbid obesity with BMI of 50.0-59.9, adult  E66.01 278.01    Z68.43 V85.43   5. Mixed hyperlipidemia  E78.2 272.2   6. Impaired fasting glucose  R73.01 790.21       Plan:     Patient is here for wellness exam and to establish care  Patient has hypertension and blood pressure seem under good control  Will continue lisinopril for now  Advised patient to monitor blood pressures at home  Patient has hypothyroidism and last TSH was at goal  Patient fasting blood sugar of 144 is high  Today nonfasting blood sugar is more than 200 + has polyuria polydipsia  Meets the criteria of diabetes.  Will start metformin  Patient has morbid obesity with BMI more than 50  Advised patient in detail about need to lose weight  Patient was previously consuming large amount of soda but has reduced it a lot  Advised patient to stop soda minimize carbohydrate consumption + walk 20 minutes a day    Medication List with Changes/Refills   Current Medications    FEZOLINETANT (VEOZAH) 45 MG TAB    Take 45 mg by mouth once daily.    HYDROXYZINE PAMOATE (VISTARIL) 25 MG CAP    TAKE 2 CAPSULES BY MOUTH DAILY AS NEEDED    IBUPROFEN (ADVIL,MOTRIN) 800 MG TABLET    TAKE 1 TABLET BY MOUTH EVERY 8 HOURS WITH FOOD AS NEEDED FOR PAIN. MAY TAKE WITH TYLENOL    IMVEXXY MAINTENANCE PACK 4 MCG INST    Place 1 suppository vaginally twice a week.    LAMOTRIGINE (LAMICTAL) 25 MG TABLET    Take 25 mg by mouth every evening.    LEVOTHYROXINE (SYNTHROID) 75 MCG TABLET    Take 75 mcg by mouth.    LISINOPRIL (PRINIVIL,ZESTRIL) 5 MG TABLET    Take 1 tablet by mouth once daily.    NICOTINE (NICODERM CQ) 14 MG/24 HR        NORETHINDRONE (MICRONOR) 0.35 MG TABLET    Take 1 tablet (0.35 mg total) by mouth once daily.    PANTOPRAZOLE (PROTONIX) 40 MG TABLET    Take 40 mg by mouth once daily.    ROSUVASTATIN (CRESTOR) 10 MG TABLET    Take 10 mg by mouth every evening.    TESTOSTERONE, BULK, POWD    COMPOUNDED TESTOSTERONE CREAM 3MG/ML APPLY 1ML TOPICALLY DAILY AS  DIRECTED #30GM WITH 5RF

## 2024-05-20 LAB
ALBUMIN SERPL-MCNC: 4.2 G/DL (ref 3.5–5.2)
ALBUMIN/GLOB SERPL ELPH: 1.1 {RATIO} (ref 1–2.7)
ALP ISOS SERPL LEV INH-CCNC: 82 U/L (ref 35–105)
ALT (SGPT): 11 U/L (ref 0–33)
ANION GAP SERPL CALC-SCNC: 11 MMOL/L (ref 8–17)
AST SERPL-CCNC: 12 U/L (ref 0–32)
BILIRUBIN, TOTAL: 0.26 MG/DL (ref 0–1.2)
BUN/CREAT SERPL: 15.9 (ref 6–20)
CALCIUM SERPL-MCNC: 9.6 MG/DL (ref 8.6–10.2)
CARBON DIOXIDE, CO2: 28 MMOL/L (ref 22–29)
CHLORIDE: 102 MMOL/L (ref 98–107)
CREAT SERPL-MCNC: 0.79 MG/DL (ref 0.5–0.9)
GFR ESTIMATION: 95.72 ML/MIN/1.73M2
GLOBULIN: 3.9 G/DL (ref 1.5–4.5)
GLUCOSE: 134 MG/DL (ref 74–106)
POTASSIUM: 5 MMOL/L (ref 3.5–5.1)
PROT SNV-MCNC: 8.1 G/DL (ref 6.4–8.3)
SODIUM: 141 MMOL/L (ref 136–145)
UREA NITROGEN (BUN): 12.6 MG/DL (ref 6–20)

## 2024-05-24 ENCOUNTER — OFFICE VISIT (OUTPATIENT)
Dept: URGENT CARE | Facility: CLINIC | Age: 44
End: 2024-05-24
Payer: COMMERCIAL

## 2024-05-24 VITALS
OXYGEN SATURATION: 97 % | SYSTOLIC BLOOD PRESSURE: 136 MMHG | BODY MASS INDEX: 51.91 KG/M2 | RESPIRATION RATE: 16 BRPM | HEART RATE: 91 BPM | WEIGHT: 293 LBS | DIASTOLIC BLOOD PRESSURE: 89 MMHG | HEIGHT: 63 IN

## 2024-05-24 DIAGNOSIS — I10 ELEVATED BLOOD PRESSURE READING IN OFFICE WITH DIAGNOSIS OF HYPERTENSION: ICD-10-CM

## 2024-05-24 DIAGNOSIS — M25.562 ACUTE BILATERAL KNEE PAIN: Primary | ICD-10-CM

## 2024-05-24 DIAGNOSIS — M17.0 OSTEOARTHRITIS OF BOTH KNEES, UNSPECIFIED OSTEOARTHRITIS TYPE: ICD-10-CM

## 2024-05-24 DIAGNOSIS — M25.561 ACUTE BILATERAL KNEE PAIN: Primary | ICD-10-CM

## 2024-05-24 DIAGNOSIS — G89.29 BILATERAL CHRONIC KNEE PAIN: ICD-10-CM

## 2024-05-24 DIAGNOSIS — M25.561 BILATERAL CHRONIC KNEE PAIN: ICD-10-CM

## 2024-05-24 DIAGNOSIS — M25.562 BILATERAL CHRONIC KNEE PAIN: ICD-10-CM

## 2024-05-24 PROCEDURE — 99214 OFFICE O/P EST MOD 30 MIN: CPT | Mod: 25,S$GLB,, | Performed by: NURSE PRACTITIONER

## 2024-05-24 PROCEDURE — 96372 THER/PROPH/DIAG INJ SC/IM: CPT | Mod: S$GLB,,, | Performed by: FAMILY MEDICINE

## 2024-05-24 RX ORDER — INDOMETHACIN 50 MG/1
50 CAPSULE ORAL 3 TIMES DAILY PRN
Qty: 20 CAPSULE | Refills: 0 | Status: SHIPPED | OUTPATIENT
Start: 2024-05-24

## 2024-05-24 RX ORDER — KETOROLAC TROMETHAMINE 30 MG/ML
30 INJECTION, SOLUTION INTRAMUSCULAR; INTRAVENOUS
Status: COMPLETED | OUTPATIENT
Start: 2024-05-24 | End: 2024-05-24

## 2024-05-24 RX ADMIN — KETOROLAC TROMETHAMINE 30 MG: 30 INJECTION, SOLUTION INTRAMUSCULAR; INTRAVENOUS at 07:05

## 2024-05-25 NOTE — PATIENT INSTRUCTIONS
Please follow up with your primary care provider within 2-5 days if your signs and symptoms have not resolved or worsen.     If your condition worsens or fails to improve we recommend that you receive another evaluation at the emergency room immediately or contact your primary medical clinic to discuss your concerns.   You must understand that you have received an Urgent Care treatment only and that you may be released before all of your medical problems are known or treated. You, the patient, will arrange for follow up care as instructed.       Rest as often as possible. Limit weight bearing.  Use knee braces/compression wraps.  Apply Ice packs.  Take Indomethacin as needed for pain/swelling and discontinue Ibuprofen.  Topical pain relievers such as Acuplus, voltaren gel, biofreeze.  Follow up with ortho on Tuesday.     What care is needed at home?   Ask your doctor what you need to do when you go home. Make sure you ask questions if you do not understand what the doctor says. This way you will know what you need to do.  Rest. Allow your injury to heal before you do slow movements.  Place an ice pack or a bag of frozen peas wrapped in a towel over the painful part. Never put ice right on the skin. Do not leave the ice on more than 10 to 15 minutes at a time.  Prop your leg up on pillows to help with swelling.  Brace or neoprene sleeve for support and swelling  Heat may be used later but not right away. Heat can make swelling worse. If your doctor tells you to use heat, put a heating pad on the painful part for no more than 20 minutes at a time. Never go to sleep with a heating pad on as this can cause burns.

## 2024-05-25 NOTE — PROGRESS NOTES
"Subjective:      Patient ID: Valerie Pérez is a 44 y.o. female.    Vitals:  height is 5' 2.5" (1.588 m) and weight is 134.3 kg (296 lb). Her blood pressure is 136/89 and her pulse is 91. Her respiration is 16 and oxygen saturation is 97%.     Chief Complaint: Knee Pain    Patient complaints: chronic bilateral knee pain   -she usually gets steroid injections in her knees but her appointment isn't for a couple of months and she wants to get a prescription strength anti-inflammatory  -taking ibuprofen with no relief  -sees Dr. Yanez for knees  Pain worse with prolonged hours of weight bearing which is a requirement of her job. States pain has become intolerable and is unrelieved with her usual anti-inflammatory regimen.  No injury reported.        Knee Pain   The incident occurred more than 1 week ago. There was no injury mechanism. The pain is present in the right knee and left knee. The pain is at a severity of 8/10. The pain is severe. The pain has been Constant since onset. Pertinent negatives include no numbness. She reports no foreign bodies present. The symptoms are aggravated by weight bearing. She has tried NSAIDs for the symptoms. The treatment provided no relief.       Constitution: Negative for appetite change, chills, sweating, fatigue and fever.   Musculoskeletal:  Positive for pain, joint pain, joint swelling and arthritis. Negative for trauma.   Skin:  Negative for color change, pale, rash, erythema and bruising.   Neurological:  Negative for dizziness, history of vertigo, light-headedness, disorientation, altered mental status, numbness and tingling.   Psychiatric/Behavioral:  Negative for altered mental status, disorientation and confusion.       Objective:     Physical Exam   Constitutional: She is oriented to person, place, and time.      Comments:Tearful, appears to be in a moderate amount of discomfort   obesity  HENT:   Head: Normocephalic and atraumatic.   Cardiovascular: Normal rate. "   Pulmonary/Chest: Effort normal.   Musculoskeletal:         General: Swelling and tenderness present. No deformity or signs of injury.      Right knee: She exhibits swelling and bony tenderness. Tenderness found.      Left knee: She exhibits swelling and bony tenderness. Tenderness found.        Legs:       Comments: Bilateral anterior knee swelling,tenderness to palpation over entire knees, and crepitus. Full but painful ROM. Pain elicited with flexion and extension of knee joints.     Neurological: She is alert and oriented to person, place, and time.   Skin: No bruising and No erythema   Nursing note and vitals reviewed.      Assessment:     1. Acute bilateral knee pain    2. Osteoarthritis of both knees, unspecified osteoarthritis type    3. Bilateral chronic knee pain    4. Elevated blood pressure reading in office with diagnosis of hypertension        Plan:       Acute bilateral knee pain  -     ketorolac injection 30 mg  -     indomethacin (INDOCIN) 50 MG capsule; Take 1 capsule (50 mg total) by mouth 3 (three) times daily as needed (knee pain).  Dispense: 20 capsule; Refill: 0    Osteoarthritis of both knees, unspecified osteoarthritis type  -     ketorolac injection 30 mg  -     indomethacin (INDOCIN) 50 MG capsule; Take 1 capsule (50 mg total) by mouth 3 (three) times daily as needed (knee pain).  Dispense: 20 capsule; Refill: 0    Bilateral chronic knee pain    Elevated blood pressure reading in office with diagnosis of hypertension          Medical Decision Making:   History:   Old Records Summarized: records from clinic visits.       <> Summary of Records: Reviewed recent pcp encounter note.     Patient Instructions   Please follow up with your primary care provider within 2-5 days if your signs and symptoms have not resolved or worsen.     If your condition worsens or fails to improve we recommend that you receive another evaluation at the emergency room immediately or contact your primary medical  clinic to discuss your concerns.   You must understand that you have received an Urgent Care treatment only and that you may be released before all of your medical problems are known or treated. You, the patient, will arrange for follow up care as instructed.       Rest as often as possible. Limit weight bearing.  Use knee braces/compression wraps.  Apply Ice packs.  Take Indomethacin as needed for pain/swelling and discontinue Ibuprofen.  Topical pain relievers such as Acuplus, voltaren gel, biofreeze.  Follow up with ortho on Tuesday.     What care is needed at home?   Ask your doctor what you need to do when you go home. Make sure you ask questions if you do not understand what the doctor says. This way you will know what you need to do.  Rest. Allow your injury to heal before you do slow movements.  Place an ice pack or a bag of frozen peas wrapped in a towel over the painful part. Never put ice right on the skin. Do not leave the ice on more than 10 to 15 minutes at a time.  Prop your leg up on pillows to help with swelling.  Brace or neoprene sleeve for support and swelling  Heat may be used later but not right away. Heat can make swelling worse. If your doctor tells you to use heat, put a heating pad on the painful part for no more than 20 minutes at a time. Never go to sleep with a heating pad on as this can cause burns.

## 2024-06-04 ENCOUNTER — PROCEDURE VISIT (OUTPATIENT)
Dept: OBSTETRICS AND GYNECOLOGY | Facility: CLINIC | Age: 44
End: 2024-06-04
Payer: COMMERCIAL

## 2024-06-04 DIAGNOSIS — N83.201 RIGHT OVARIAN CYST: ICD-10-CM

## 2024-06-04 PROCEDURE — 76830 TRANSVAGINAL US NON-OB: CPT | Mod: S$GLB,,, | Performed by: OBSTETRICS & GYNECOLOGY

## 2024-06-07 ENCOUNTER — TELEPHONE (OUTPATIENT)
Dept: OBSTETRICS AND GYNECOLOGY | Facility: CLINIC | Age: 44
End: 2024-06-07
Payer: COMMERCIAL

## 2024-06-07 DIAGNOSIS — Z92.89 HISTORY OF ENDOMETRIAL BIOPSY: Primary | ICD-10-CM

## 2024-06-07 RX ORDER — MISOPROSTOL 200 UG/1
TABLET ORAL
Qty: 9 TABLET | Refills: 0 | Status: SHIPPED | OUTPATIENT
Start: 2024-06-07

## 2024-06-07 NOTE — TELEPHONE ENCOUNTER
Phone call to pt, notified of need for in-office EMB with Dr. Armstrong since she is still having menorrhagia, despite trying POP, and is unable to receive medical clearance for her hysteroscopy D&C. The office will call her to schedule procedure, she verbalized understanding.

## 2024-06-10 ENCOUNTER — PATIENT MESSAGE (OUTPATIENT)
Dept: OBSTETRICS AND GYNECOLOGY | Facility: CLINIC | Age: 44
End: 2024-06-10
Payer: COMMERCIAL

## 2024-06-12 ENCOUNTER — PATIENT MESSAGE (OUTPATIENT)
Dept: OBSTETRICS AND GYNECOLOGY | Facility: CLINIC | Age: 44
End: 2024-06-12
Payer: COMMERCIAL

## 2024-06-13 RX ORDER — LISINOPRIL 5 MG/1
5 TABLET ORAL DAILY
Qty: 90 TABLET | Refills: 0 | Status: SHIPPED | OUTPATIENT
Start: 2024-06-13

## 2024-06-13 NOTE — TELEPHONE ENCOUNTER
----- Message from Barbara Gallo sent at 6/13/2024  9:47 AM CDT -----  Contact: Valerie  Type:  RX Refill Request    Who Called: Valerie  Refill or New Rx:refill  RX Name and Strength:lisinopriL (PRINIVIL,ZESTRIL) 5 MG tablet   How is the patient currently taking it? (ex. 1XDay):as prescribed  Is this a 30 day or 90 day RX:90  Preferred Pharmacy with phone number:  [id number] - [place], [place] - [facility] [address]  Phone: [phone] Fax: [phone]  Local or Mail Order:local  Ordering Provider:Dr. Peraza  Would the patient rather a call back or a response via [facility]? call  Best Call Back Number:[phone]  Additional Information: Patient request prescription refill. Please give patient a call back to notify when sent.  Thank you,  GH

## 2024-06-26 ENCOUNTER — OFFICE VISIT (OUTPATIENT)
Dept: PRIMARY CARE CLINIC | Facility: CLINIC | Age: 44
End: 2024-06-26
Payer: COMMERCIAL

## 2024-06-26 VITALS
OXYGEN SATURATION: 98 % | WEIGHT: 285.13 LBS | RESPIRATION RATE: 16 BRPM | TEMPERATURE: 99 F | BODY MASS INDEX: 50.52 KG/M2 | DIASTOLIC BLOOD PRESSURE: 70 MMHG | HEIGHT: 63 IN | HEART RATE: 105 BPM | SYSTOLIC BLOOD PRESSURE: 116 MMHG

## 2024-06-26 DIAGNOSIS — E11.65 TYPE 2 DIABETES MELLITUS WITH HYPERGLYCEMIA, WITHOUT LONG-TERM CURRENT USE OF INSULIN: Primary | ICD-10-CM

## 2024-06-26 DIAGNOSIS — E03.9 HYPOTHYROIDISM, UNSPECIFIED TYPE: ICD-10-CM

## 2024-06-26 DIAGNOSIS — E78.2 MIXED HYPERLIPIDEMIA: ICD-10-CM

## 2024-06-26 LAB — GLUCOSE SERPL-MCNC: 116 MG/DL (ref 70–110)

## 2024-06-26 PROCEDURE — 4010F ACE/ARB THERAPY RXD/TAKEN: CPT | Mod: CPTII,S$GLB,, | Performed by: INTERNAL MEDICINE

## 2024-06-26 PROCEDURE — 99214 OFFICE O/P EST MOD 30 MIN: CPT | Mod: S$GLB,,, | Performed by: INTERNAL MEDICINE

## 2024-06-26 PROCEDURE — 82962 GLUCOSE BLOOD TEST: CPT | Mod: ,,, | Performed by: INTERNAL MEDICINE

## 2024-06-26 PROCEDURE — 3074F SYST BP LT 130 MM HG: CPT | Mod: CPTII,S$GLB,, | Performed by: INTERNAL MEDICINE

## 2024-06-26 PROCEDURE — 3078F DIAST BP <80 MM HG: CPT | Mod: CPTII,S$GLB,, | Performed by: INTERNAL MEDICINE

## 2024-06-26 PROCEDURE — 3008F BODY MASS INDEX DOCD: CPT | Mod: CPTII,S$GLB,, | Performed by: INTERNAL MEDICINE

## 2024-06-26 PROCEDURE — 1159F MED LIST DOCD IN RCRD: CPT | Mod: CPTII,S$GLB,, | Performed by: INTERNAL MEDICINE

## 2024-06-26 RX ORDER — TIRZEPATIDE 2.5 MG/.5ML
2.5 INJECTION, SOLUTION SUBCUTANEOUS
Qty: 4 PEN | Refills: 0 | Status: SHIPPED | OUTPATIENT
Start: 2024-06-26

## 2024-06-26 RX ORDER — ROSUVASTATIN CALCIUM 20 MG/1
20 TABLET, COATED ORAL DAILY
Qty: 90 TABLET | Refills: 3 | Status: SHIPPED | OUTPATIENT
Start: 2024-06-26 | End: 2025-06-26

## 2024-06-26 RX ORDER — DEXTROAMPHETAMINE SACCHARATE, AMPHETAMINE ASPARTATE, DEXTROAMPHETAMINE SULFATE AND AMPHETAMINE SULFATE 1.25; 1.25; 1.25; 1.25 MG/1; MG/1; MG/1; MG/1
1 TABLET ORAL 2 TIMES DAILY
COMMUNITY
Start: 2024-06-18

## 2024-06-26 NOTE — PROGRESS NOTES
Subjective:      Patient ID: Valerie Pérez is a 44 y.o. female.    Chief Complaint: Diabetes (1month f/u )    Patient has ?? bipolar disorder and is being followed by Psychiatry/ Abran Robles . Patient reports symptoms are under ok control.  Patient also has questionable diagnosis of ADD and is on Adderall.     Patient is recently diagnosed to have diabetes with last A1c of 7.2.  Patient is on metformin only for previous diagnosis of impaired fasting glucose/insulin resistance.  Patient reports fasting blood sugars are running high with blood sugar running between 120-150.  Patient reports polyuria, polydipsia but denies numbness in hands or feet.  Patient also has hypothyroidism and last TSH was at goal.  Patient is on levothyroxine 75 mcg.  Patient has hyperlipidemia with last LDL of 88 is slightly higher than target.  Patient is taking Crestor 10 mg.    Review of Systems   Constitutional:  Positive for weight loss (5 lb weight loss). Negative for chills, diaphoresis, fever and malaise/fatigue.   HENT:  Negative for congestion, ear pain, sinus pain, sore throat and tinnitus.    Eyes:  Negative for blurred vision and photophobia.   Respiratory:  Negative for cough, hemoptysis, shortness of breath and wheezing.    Cardiovascular:  Negative for chest pain, palpitations, orthopnea, leg swelling and PND.   Gastrointestinal:  Positive for diarrhea. Negative for abdominal pain, blood in stool, constipation, heartburn, melena, nausea and vomiting.   Genitourinary:  Positive for frequency. Negative for dysuria and urgency.   Musculoskeletal:  Negative for back pain, myalgias and neck pain.   Skin:  Negative for rash.   Neurological:  Negative for dizziness, tremors, seizures, loss of consciousness and weakness.   Endo/Heme/Allergies:  Positive for polydipsia.   Psychiatric/Behavioral:  Negative for depression and hallucinations. The patient does not have insomnia.      Objective:   /70 (BP Location: Left arm, Patient  "Position: Sitting, BP Method: Thigh Cuff (Manual))   Pulse 105   Temp 99 °F (37.2 °C) (Oral)   Resp 16   Ht 5' 2.5" (1.588 m)   Wt 129.3 kg (285 lb 1.6 oz)   SpO2 98%   BMI 51.31 kg/m²     Physical Exam: Patient Not Examined   Assessment:       ICD-10-CM ICD-9-CM   1. Type 2 diabetes mellitus with hyperglycemia, without long-term current use of insulin  E11.65 250.00     790.29   2. Hypothyroidism, unspecified type  E03.9 244.9   3. Mixed hyperlipidemia  E78.2 272.2       Plan:     Patient with diabetes with last A1c of 7.2 is not at goal  Will start patient on Mounjaro  Patient denies family history of thyroid cancer or personal history of pancreatitis  Medicine should also help with weight loss  Patient BMI of 51 is really high  Patient is prescribed Adderall by psychiatrist and probably is helping with weight loss  Patient has questionable history of bipolar disorder and ADD.   Will try to get notes from Psychiatry  Patient last LDL of 88 is not at goal  Will increase Crestor to 20 mg  Patient last TSH was at goal.  Will continue levothyroxine      Medication List with Changes/Refills   New Medications    ROSUVASTATIN (CRESTOR) 20 MG TABLET    Take 1 tablet (20 mg total) by mouth once daily.    TIRZEPATIDE (MOUNJARO) 2.5 MG/0.5 ML PNIJ    Inject 2.5 mg into the skin every 7 days.   Current Medications    BLOOD SUGAR DIAGNOSTIC (TRUE METRIX GLUCOSE TEST STRIP) STRP    1 strip by Misc.(Non-Drug; Combo Route) route once daily.    DEXTROAMPHETAMINE-AMPHETAMINE 5 MG TAB    Take 1 tablet by mouth 2 (two) times daily.    LAMOTRIGINE (LAMICTAL) 25 MG TABLET    Take 25 mg by mouth every evening.    LANCETS 32 GAUGE MISC    1 lancet  by Misc.(Non-Drug; Combo Route) route once daily.    LEVOTHYROXINE (SYNTHROID) 75 MCG TABLET    Take 75 mcg by mouth.    LISINOPRIL (PRINIVIL,ZESTRIL) 5 MG TABLET    Take 1 tablet (5 mg total) by mouth once daily.    METFORMIN (GLUCOPHAGE) 500 MG TABLET    Take 1 tablet (500 mg total) " by mouth 2 (two) times daily with meals.    NICOTINE (NICODERM CQ) 14 MG/24 HR        PANTOPRAZOLE (PROTONIX) 40 MG TABLET    Take 40 mg by mouth once daily.   Discontinued Medications    FEZOLINETANT (VEOZAH) 45 MG TAB    Take 45 mg by mouth once daily.    HYDROXYZINE PAMOATE (VISTARIL) 25 MG CAP    TAKE 2 CAPSULES BY MOUTH DAILY AS NEEDED    IMVEXXY MAINTENANCE PACK 4 MCG INST    Place 1 suppository vaginally twice a week.    INDOMETHACIN (INDOCIN) 50 MG CAPSULE    Take 1 capsule (50 mg total) by mouth 3 (three) times daily as needed (knee pain).    MISOPROSTOL (CYTOTEC) 200 MCG TAB    Take 3 tablets at lunch, 3 tablets at bedtime the day before procedure, and take 3 tablets the morning of EMB.    NORETHINDRONE (MICRONOR) 0.35 MG TABLET    Take 1 tablet (0.35 mg total) by mouth once daily.    ROSUVASTATIN (CRESTOR) 10 MG TABLET    Take 10 mg by mouth every evening.    TESTOSTERONE, BULK, POWD    COMPOUNDED TESTOSTERONE CREAM 3MG/ML APPLY 1ML TOPICALLY DAILY AS DIRECTED #30GM WITH 5RF

## 2024-06-27 ENCOUNTER — TELEPHONE (OUTPATIENT)
Dept: PRIMARY CARE CLINIC | Facility: CLINIC | Age: 44
End: 2024-06-27
Payer: COMMERCIAL

## 2024-06-27 NOTE — TELEPHONE ENCOUNTER
Pa HAS BEEN COMPLETED FOR HER MOUNJARO AND SENT TO HER INS--NOW WAITING ON A REPLY WHICH CAN TAKE UP TO 72 HRS

## 2024-06-27 NOTE — TELEPHONE ENCOUNTER
PT  INFORMED HER INS HAS APPROVED HER REQUEST FOR MOUNJARO--PT INFORMED SHE CAN CALL HER PHARMACY AND FIND OUT WHEN SHE CAN  MED--PT STATES MED IS OUT OF STOCK AT HER PHARMACY--PT IS TO CALL AROUND AND FIND OUT IF ANY OTHER PHARMACIES HAVE MOUNJARO AND GET HER RX TRANSFERRED

## 2024-06-28 ENCOUNTER — PATIENT OUTREACH (OUTPATIENT)
Dept: ADMINISTRATIVE | Facility: HOSPITAL | Age: 44
End: 2024-06-28
Payer: COMMERCIAL

## 2024-07-12 ENCOUNTER — PROCEDURE VISIT (OUTPATIENT)
Dept: OBSTETRICS AND GYNECOLOGY | Facility: CLINIC | Age: 44
End: 2024-07-12
Payer: COMMERCIAL

## 2024-07-12 VITALS
DIASTOLIC BLOOD PRESSURE: 85 MMHG | BODY MASS INDEX: 53.58 KG/M2 | HEART RATE: 74 BPM | HEIGHT: 62 IN | WEIGHT: 291.19 LBS | SYSTOLIC BLOOD PRESSURE: 122 MMHG

## 2024-07-12 DIAGNOSIS — N92.0 MENORRHAGIA WITH REGULAR CYCLE: Primary | ICD-10-CM

## 2024-07-12 NOTE — PROCEDURES
Procedures  She has bleeding after an ablation   her cx is so high that I cannot do an EMB  Based on her menorrhagia I feel she needs to have a TLH  she is open to this     If her and d&C are in the recent last year or two I would feel ok proceeding if not then I might need to make an effort in the OR to perform an EUA with a hysteroscopy and D&C  before preforming a Hyst  She understands and is aware

## 2024-07-19 ENCOUNTER — TELEPHONE (OUTPATIENT)
Dept: OBSTETRICS AND GYNECOLOGY | Facility: CLINIC | Age: 44
End: 2024-07-19
Payer: COMMERCIAL

## 2024-07-19 DIAGNOSIS — N92.0 MENORRHAGIA WITH REGULAR CYCLE: Primary | ICD-10-CM

## 2024-07-19 NOTE — TELEPHONE ENCOUNTER
Phone call to pt. Discussed that Dr. Armstrong reviewed her benign pathology results from ablation D&C done at [facility] on 1/8/22. He is comfortable moving forward with her TLH at this point to help manage her heavy cycles. Since she is a current smoker & has HTN she is limited on birth control products she can use. She is currently on POP, still with heavy cycles. She is awaiting medical/surgical clearance from her pulmonologist Dr. Sellers for her sleep apnea. I have placed the referral for TLH, to be done as soon as she has medical clearance. If she is not able to schedule TLH by January, explained Dr. Armstrong will need to first do a hysteroscopy D&C instead to re-biopsy her uterine lining, as this was not doable with in office EMB. She is to follow up with us once she receives clearance or if she has any setbacks prior to January. She verbalized understanding.

## 2024-07-30 ENCOUNTER — OFFICE VISIT (OUTPATIENT)
Dept: PRIMARY CARE CLINIC | Facility: CLINIC | Age: 44
End: 2024-07-30
Payer: COMMERCIAL

## 2024-07-30 VITALS
OXYGEN SATURATION: 99 % | DIASTOLIC BLOOD PRESSURE: 80 MMHG | WEIGHT: 283.63 LBS | HEIGHT: 62 IN | BODY MASS INDEX: 52.19 KG/M2 | SYSTOLIC BLOOD PRESSURE: 126 MMHG | HEART RATE: 88 BPM

## 2024-07-30 DIAGNOSIS — I10 BENIGN ESSENTIAL HTN: ICD-10-CM

## 2024-07-30 DIAGNOSIS — E78.2 MIXED HYPERLIPIDEMIA: ICD-10-CM

## 2024-07-30 DIAGNOSIS — E11.65 TYPE 2 DIABETES MELLITUS WITH HYPERGLYCEMIA, WITHOUT LONG-TERM CURRENT USE OF INSULIN: Primary | ICD-10-CM

## 2024-07-30 DIAGNOSIS — E66.01 MORBID OBESITY WITH BMI OF 50.0-59.9, ADULT: ICD-10-CM

## 2024-07-30 PROCEDURE — 4010F ACE/ARB THERAPY RXD/TAKEN: CPT | Mod: CPTII,S$GLB,, | Performed by: INTERNAL MEDICINE

## 2024-07-30 PROCEDURE — 3051F HG A1C>EQUAL 7.0%<8.0%: CPT | Mod: CPTII,S$GLB,, | Performed by: INTERNAL MEDICINE

## 2024-07-30 PROCEDURE — 99214 OFFICE O/P EST MOD 30 MIN: CPT | Mod: S$GLB,,, | Performed by: INTERNAL MEDICINE

## 2024-07-30 PROCEDURE — 3008F BODY MASS INDEX DOCD: CPT | Mod: CPTII,S$GLB,, | Performed by: INTERNAL MEDICINE

## 2024-07-30 PROCEDURE — 3074F SYST BP LT 130 MM HG: CPT | Mod: CPTII,S$GLB,, | Performed by: INTERNAL MEDICINE

## 2024-07-30 PROCEDURE — 3079F DIAST BP 80-89 MM HG: CPT | Mod: CPTII,S$GLB,, | Performed by: INTERNAL MEDICINE

## 2024-07-30 PROCEDURE — 1159F MED LIST DOCD IN RCRD: CPT | Mod: CPTII,S$GLB,, | Performed by: INTERNAL MEDICINE

## 2024-07-30 PROCEDURE — 1160F RVW MEDS BY RX/DR IN RCRD: CPT | Mod: CPTII,S$GLB,, | Performed by: INTERNAL MEDICINE

## 2024-07-30 RX ORDER — TIRZEPATIDE 5 MG/.5ML
5 INJECTION, SOLUTION SUBCUTANEOUS
Qty: 4 PEN | Refills: 3 | Status: SHIPPED | OUTPATIENT
Start: 2024-07-30

## 2024-07-30 NOTE — PROGRESS NOTES
"  Subjective:      Patient ID: Valerie Pérez is a 44 y.o. female.    Chief Complaint: Follow-up     Patient with questionable diagnosis of bipolar disorder and is being followed by Psychiatry/Ms Abran Robles.  Patient reports symptoms are under good control.  Patient was on Adderall for possible ADD.  Medications were stopped on last visit and patient denies any symptoms even without medication.    Patient has diabetes with last A1c of 7.2.  Patient is on metformin and Mounjaro.  Home blood sugars are not being monitored.  Patient has lost another 8 lb (296- 283) total of 13 lb.  Patient denies polyuria polydipsia, numbness in hands or feet.  Patient has hypothyroidism and last TSH was at goal.  Patient last LDL of 88 was not at goal.  Patient is on rosuvastatin and reports compliance    Review of Systems   Constitutional:  Positive for weight loss (8 lb weight loss). Negative for chills, diaphoresis, fever and malaise/fatigue.   HENT:  Negative for congestion, ear pain, sinus pain, sore throat and tinnitus.    Eyes:  Negative for blurred vision and photophobia.   Respiratory:  Negative for cough, hemoptysis, shortness of breath and wheezing.    Cardiovascular:  Negative for chest pain, palpitations, orthopnea, leg swelling and PND.   Gastrointestinal:  Negative for abdominal pain, blood in stool, constipation, diarrhea, heartburn, melena, nausea and vomiting.   Genitourinary:  Negative for dysuria, frequency and urgency.   Musculoskeletal:  Negative for back pain, myalgias and neck pain.   Skin:  Negative for rash.   Neurological:  Negative for dizziness, tremors, seizures, loss of consciousness and weakness.   Endo/Heme/Allergies:  Negative for polydipsia.   Psychiatric/Behavioral:  Negative for depression and hallucinations. The patient does not have insomnia.      Objective:   /80 (BP Location: Right arm, Patient Position: Sitting, BP Method: Large (Automatic))   Pulse 88   Ht 5' 2" (1.575 m)   Wt 128.6 kg " (283 lb 9.6 oz)   LMP 06/24/2024   SpO2 99%   BMI 51.87 kg/m²     Physical Exam: Patient nt examined   Assessment:       ICD-10-CM ICD-9-CM   1. Type 2 diabetes mellitus with hyperglycemia, without long-term current use of insulin  E11.65 250.00     790.29   2. Mixed hyperlipidemia  E78.2 272.2   3. Benign essential HTN  I10 401.1   4. Morbid obesity with BMI of 50.0-59.9, adult  E66.01 278.01    Z68.43 V85.43       Plan:     Patient with diabetes with last A1c of 7.2 suggest poorly-controlled blood sugar  Patient is on Mounjaro and is losing weight  Will repeat A1c  Will increase the dose to 5 mg  Patient has hypothyroidism and last TSH was at goal  Will continue same medication  Patient has class 3 obesity with BMI more than 50.  Patient is losing weight with Mounjaro  Will continue medication  Patient last LDL of 88 is not at goal  Will repeat lipid profile  Medication List with Changes/Refills   New Medications    TIRZEPATIDE (MOUNJARO) 5 MG/0.5 ML PNIJ    Inject 5 mg into the skin every 7 days.   Current Medications    BLOOD SUGAR DIAGNOSTIC (TRUE METRIX GLUCOSE TEST STRIP) STRP    1 strip by Misc.(Non-Drug; Combo Route) route once daily.    LAMOTRIGINE (LAMICTAL) 25 MG TABLET    Take 25 mg by mouth every evening.    LANCETS 32 GAUGE MISC    1 lancet  by Misc.(Non-Drug; Combo Route) route once daily.    LEVOTHYROXINE (SYNTHROID) 75 MCG TABLET    Take 75 mcg by mouth.    LISINOPRIL (PRINIVIL,ZESTRIL) 5 MG TABLET    Take 1 tablet (5 mg total) by mouth once daily.    METFORMIN (GLUCOPHAGE) 500 MG TABLET    Take 1 tablet (500 mg total) by mouth 2 (two) times daily with meals.    NICOTINE (NICODERM CQ) 14 MG/24 HR        PANTOPRAZOLE (PROTONIX) 40 MG TABLET    Take 40 mg by mouth once daily.    ROSUVASTATIN (CRESTOR) 20 MG TABLET    Take 1 tablet (20 mg total) by mouth once daily.   Discontinued Medications    DEXTROAMPHETAMINE-AMPHETAMINE 5 MG TAB    Take 1 tablet by mouth 2 (two) times daily.    TIRZEPATIDE  (MOUNJARO) 2.5 MG/0.5 ML PNIJ    Inject 2.5 mg into the skin every 7 days.

## 2024-08-07 ENCOUNTER — TELEPHONE (OUTPATIENT)
Dept: OBSTETRICS AND GYNECOLOGY | Facility: CLINIC | Age: 44
End: 2024-08-07
Payer: COMMERCIAL

## 2024-08-16 ENCOUNTER — OFFICE VISIT (OUTPATIENT)
Dept: URGENT CARE | Facility: CLINIC | Age: 44
End: 2024-08-16
Payer: COMMERCIAL

## 2024-08-16 VITALS
SYSTOLIC BLOOD PRESSURE: 151 MMHG | RESPIRATION RATE: 16 BRPM | HEIGHT: 62 IN | DIASTOLIC BLOOD PRESSURE: 94 MMHG | OXYGEN SATURATION: 97 % | HEART RATE: 98 BPM | WEIGHT: 283 LBS | BODY MASS INDEX: 52.08 KG/M2 | TEMPERATURE: 100 F

## 2024-08-16 DIAGNOSIS — I10 ELEVATED BLOOD PRESSURE READING IN OFFICE WITH DIAGNOSIS OF HYPERTENSION: ICD-10-CM

## 2024-08-16 DIAGNOSIS — Z20.822 SUSPECTED 2019 NOVEL CORONAVIRUS INFECTION: Primary | ICD-10-CM

## 2024-08-16 DIAGNOSIS — E66.01 MORBID OBESITY WITH BMI OF 50.0-59.9, ADULT: ICD-10-CM

## 2024-08-16 DIAGNOSIS — E11.9 TYPE 2 DIABETES MELLITUS WITHOUT COMPLICATION, WITHOUT LONG-TERM CURRENT USE OF INSULIN: ICD-10-CM

## 2024-08-16 DIAGNOSIS — Z20.822 CONTACT WITH AND (SUSPECTED) EXPOSURE TO COVID-19: ICD-10-CM

## 2024-08-16 DIAGNOSIS — R50.9 FEVER, UNSPECIFIED FEVER CAUSE: ICD-10-CM

## 2024-08-16 DIAGNOSIS — R52 BODY ACHES: ICD-10-CM

## 2024-08-16 LAB
CTP QC/QA: YES
SARS-COV-2 AG RESP QL IA.RAPID: NEGATIVE

## 2024-08-16 RX ORDER — FLUTICASONE PROPIONATE 50 MCG
1 SPRAY, SUSPENSION (ML) NASAL DAILY
Qty: 16 EACH | Refills: 0 | Status: SHIPPED | OUTPATIENT
Start: 2024-08-16

## 2024-08-16 RX ORDER — AZELASTINE 1 MG/ML
1 SPRAY, METERED NASAL 2 TIMES DAILY
Qty: 30 ML | Refills: 3 | Status: SHIPPED | OUTPATIENT
Start: 2024-08-16 | End: 2025-08-16

## 2024-08-17 NOTE — PATIENT INSTRUCTIONS
Please follow up with your primary care provider within 2-5 days if your signs and symptoms have not resolved or worsen.     If your condition worsens or fails to improve we recommend that you receive another evaluation at the emergency room immediately or contact your primary medical clinic to discuss your concerns.   You must understand that you have received an Urgent Care treatment only and that you may be released before all of your medical problems are known or treated. You, the patient, will arrange for follow up care as instructed.     You have tested NEGATIVE for COVID-19 today, however it is recommended that you repeat testing using a OTC self test kit in the next 1-2 days for confirmation.  The CDC encourages you to follow these strategies to help prevent the spread of Respiratory Viruses when you are sick:  -Stay home and away from others until you are FEVER FREE (without the use of fever reducing medications) AND your symptoms have improved      Since it is suspected that you have COVID-19 and have medical problems placing you a higher risk for complications, I am prescribing an antiviral, Paxlovid.  There are potential drug interactions/contraindications associated with coadministration of Rosuvastatin with Paxlovid.   Please  HOLD doses of Rosuvastatin  12 hours prior to the first dose of Paxlovid, HOLD doses during 5 days of Paxlovid therapy, and resume regular dosing 5 days after completion of Paxlovid treatment.

## 2024-08-17 NOTE — PROGRESS NOTES
"Subjective:      Patient ID: Valerie Pérez is a 44 y.o. female.    Vitals:  height is 5' 2" (1.575 m) and weight is 128.4 kg (283 lb). Her oral temperature is 99.8 °F (37.7 °C). Her blood pressure is 151/94 (abnormal) and her pulse is 98. Her respiration is 16 and oxygen saturation is 97%.     Chief Complaint: Generalized Body Aches    Patient complaints: body aches that started today  -has been around a few co-workers that are sick  -taking ibuprofen with last dose around 10am  Denies any additional symptoms including cough, congestion, headache, sore throat, fever, runny nose.          Other  This is a new problem. The current episode started today. The problem occurs constantly. The problem has been unchanged. Associated symptoms include fatigue and myalgias. Pertinent negatives include no abdominal pain, chest pain, chills, congestion, coughing, diaphoresis, fever, headaches, nausea, neck pain, rash, sore throat or vomiting. Nothing aggravates the symptoms. She has tried NSAIDs for the symptoms. The treatment provided no relief.       Constitution: Positive for fatigue. Negative for activity change, appetite change, chills, sweating and fever.   HENT:  Negative for congestion and sore throat.    Neck: Negative for neck pain, neck stiffness and painful lymph nodes.   Cardiovascular:  Negative for chest pain, leg swelling, palpitations, sob on exertion and passing out.   Respiratory:  Negative for chest tightness, cough, sputum production, shortness of breath, wheezing and asthma.    Gastrointestinal:  Negative for abdominal pain, nausea, vomiting and diarrhea.   Musculoskeletal:  Positive for muscle ache.   Skin:  Negative for color change, pale and rash.   Allergic/Immunologic: Negative for asthma and immunocompromised state.   Neurological:  Negative for dizziness, headaches, disorientation and altered mental status.   Hematologic/Lymphatic: Negative for swollen lymph nodes.   Psychiatric/Behavioral:  Negative " for altered mental status, disorientation and confusion.       Objective:     Physical Exam   Constitutional: She is oriented to person, place, and time. She does not appear ill. obesity  HENT:   Head: Normocephalic and atraumatic.   Nose: Mucosal edema and congestion present. Right sinus exhibits no maxillary sinus tenderness and no frontal sinus tenderness. Left sinus exhibits no maxillary sinus tenderness and no frontal sinus tenderness.   Mouth/Throat: Uvula is midline and mucous membranes are normal. No trismus in the jaw. No uvula swelling. Posterior oropharyngeal erythema and cobblestoning present. No oropharyngeal exudate. No tonsillar exudate.   Eyes: Conjunctivae are normal.   Cardiovascular: Normal rate, normal heart sounds and normal pulses.   Pulmonary/Chest: Effort normal and breath sounds normal.   Abdominal: Normal appearance.   Musculoskeletal: Normal range of motion.         General: Normal range of motion.   Lymphadenopathy:     She has no cervical adenopathy.        Right cervical: No superficial cervical adenopathy present.       Left cervical: No superficial cervical adenopathy present.   Neurological: She is alert and oriented to person, place, and time.   Skin: Skin is warm, dry, not diaphoretic and no rash.   Psychiatric: Her behavior is normal. Mood, judgment and thought content normal.   Nursing note and vitals reviewed.      Assessment:     1. Suspected 2019 novel coronavirus infection    2. Body aches    3. Contact with and (suspected) exposure to covid-19    4. Fever, unspecified fever cause    5. Morbid obesity with BMI of 50.0-59.9, adult    6. Type 2 diabetes mellitus without complication, without long-term current use of insulin    7. Elevated blood pressure reading in office with diagnosis of hypertension        Plan:     Office Visit on 08/16/2024   Component Date Value Ref Range Status    SARS Coronavirus 2 Antigen 08/16/2024 Negative  Negative Final     Acceptable  08/16/2024 Yes   Final   '    Suspected 2019 novel coronavirus infection  -     pyrilamine-chlophedianoL 12.5-12.5 mg/5 mL Liqd; Take 10 mLs by mouth every 6 to 8 hours as needed (cough/cold).  Dispense: 120 mL; Refill: 0  -     azelastine (ASTELIN) 137 mcg (0.1 %) nasal spray; 1 spray (137 mcg total) by Nasal route 2 (two) times daily.  Dispense: 30 mL; Refill: 3  -     fluticasone propionate (FLONASE) 50 mcg/actuation nasal spray; 1 spray (50 mcg total) by Each Nostril route once daily.  Dispense: 16 each; Refill: 0  -     nirmatrelvir-ritonavir 300 mg (150 mg x 2)-100 mg copackaged tablets (EUA); Take 3 tablets by mouth 2 (two) times daily for 5 days. Each dose contains 2 nirmatrelvir (pink tablets) and 1 ritonavir (white tablet). Take all 3 tablets together  Dispense: 30 tablet; Refill: 0    Body aches  -     SARS Coronavirus 2 Antigen, POCT Manual Read    Contact with and (suspected) exposure to covid-19  -     pyrilamine-chlophedianoL 12.5-12.5 mg/5 mL Liqd; Take 10 mLs by mouth every 6 to 8 hours as needed (cough/cold).  Dispense: 120 mL; Refill: 0  -     azelastine (ASTELIN) 137 mcg (0.1 %) nasal spray; 1 spray (137 mcg total) by Nasal route 2 (two) times daily.  Dispense: 30 mL; Refill: 3  -     fluticasone propionate (FLONASE) 50 mcg/actuation nasal spray; 1 spray (50 mcg total) by Each Nostril route once daily.  Dispense: 16 each; Refill: 0  -     nirmatrelvir-ritonavir 300 mg (150 mg x 2)-100 mg copackaged tablets (EUA); Take 3 tablets by mouth 2 (two) times daily for 5 days. Each dose contains 2 nirmatrelvir (pink tablets) and 1 ritonavir (white tablet). Take all 3 tablets together  Dispense: 30 tablet; Refill: 0    Fever, unspecified fever cause  -     pyrilamine-chlophedianoL 12.5-12.5 mg/5 mL Liqd; Take 10 mLs by mouth every 6 to 8 hours as needed (cough/cold).  Dispense: 120 mL; Refill: 0  -     azelastine (ASTELIN) 137 mcg (0.1 %) nasal spray; 1 spray (137 mcg total) by Nasal route 2 (two) times  daily.  Dispense: 30 mL; Refill: 3  -     fluticasone propionate (FLONASE) 50 mcg/actuation nasal spray; 1 spray (50 mcg total) by Each Nostril route once daily.  Dispense: 16 each; Refill: 0  -     nirmatrelvir-ritonavir 300 mg (150 mg x 2)-100 mg copackaged tablets (EUA); Take 3 tablets by mouth 2 (two) times daily for 5 days. Each dose contains 2 nirmatrelvir (pink tablets) and 1 ritonavir (white tablet). Take all 3 tablets together  Dispense: 30 tablet; Refill: 0    Morbid obesity with BMI of 50.0-59.9, adult  -     pyrilamine-chlophedianoL 12.5-12.5 mg/5 mL Liqd; Take 10 mLs by mouth every 6 to 8 hours as needed (cough/cold).  Dispense: 120 mL; Refill: 0  -     azelastine (ASTELIN) 137 mcg (0.1 %) nasal spray; 1 spray (137 mcg total) by Nasal route 2 (two) times daily.  Dispense: 30 mL; Refill: 3  -     fluticasone propionate (FLONASE) 50 mcg/actuation nasal spray; 1 spray (50 mcg total) by Each Nostril route once daily.  Dispense: 16 each; Refill: 0  -     nirmatrelvir-ritonavir 300 mg (150 mg x 2)-100 mg copackaged tablets (EUA); Take 3 tablets by mouth 2 (two) times daily for 5 days. Each dose contains 2 nirmatrelvir (pink tablets) and 1 ritonavir (white tablet). Take all 3 tablets together  Dispense: 30 tablet; Refill: 0    Type 2 diabetes mellitus without complication, without long-term current use of insulin  -     pyrilamine-chlophedianoL 12.5-12.5 mg/5 mL Liqd; Take 10 mLs by mouth every 6 to 8 hours as needed (cough/cold).  Dispense: 120 mL; Refill: 0  -     azelastine (ASTELIN) 137 mcg (0.1 %) nasal spray; 1 spray (137 mcg total) by Nasal route 2 (two) times daily.  Dispense: 30 mL; Refill: 3  -     fluticasone propionate (FLONASE) 50 mcg/actuation nasal spray; 1 spray (50 mcg total) by Each Nostril route once daily.  Dispense: 16 each; Refill: 0  -     nirmatrelvir-ritonavir 300 mg (150 mg x 2)-100 mg copackaged tablets (EUA); Take 3 tablets by mouth 2 (two) times daily for 5 days. Each dose contains  2 nirmatrelvir (pink tablets) and 1 ritonavir (white tablet). Take all 3 tablets together  Dispense: 30 tablet; Refill: 0    Elevated blood pressure reading in office with diagnosis of hypertension        Patient Instructions   Please follow up with your primary care provider within 2-5 days if your signs and symptoms have not resolved or worsen.     If your condition worsens or fails to improve we recommend that you receive another evaluation at the emergency room immediately or contact your primary medical clinic to discuss your concerns.   You must understand that you have received an Urgent Care treatment only and that you may be released before all of your medical problems are known or treated. You, the patient, will arrange for follow up care as instructed.     You have tested NEGATIVE for COVID-19 today, however it is recommended that you repeat testing using a OTC self test kit in the next 1-2 days for confirmation.  The CDC encourages you to follow these strategies to help prevent the spread of Respiratory Viruses when you are sick:  -Stay home and away from others until you are FEVER FREE (without the use of fever reducing medications) AND your symptoms have improved      Since it is suspected that you have COVID-19 and have medical problems placing you a higher risk for complications, I am prescribing an antiviral, Paxlovid.  There are potential drug interactions/contraindications associated with coadministration of Rosuvastatin with Paxlovid.   Please  HOLD doses of Rosuvastatin  12 hours prior to the first dose of Paxlovid, HOLD doses during 5 days of Paxlovid therapy, and resume regular dosing 5 days after completion of Paxlovid treatment.          Medical Decision Making:   History:   Old Records Summarized: records from clinic visits.       <> Summary of Records: Reviewed prior pcp encounter notes and prior lab results  Urgent Care Management:  - Rapid COVID-19: Negative, however the pt's clinical  presentation is suggestive of COVID-19 infection, thus I will proceed with treatment for presumed/suspected COVID-19.  - Risk stratification: mild severity classification of condition    -Treatment recommendation:  Is likely a candidate for IV Ab or oral antivirals.   -Antiviral EAU Eligibility: Obesity, T2DM, HTN   -Dose Adjustments: Renal/Hepatic Dose Reduction Is not indicated; most recent available renal function labs normal    -Disposition:  D/C home with printed educational materials including: CDC updated COVID-19 info, Paxcess savings coupon info, and Paxlovid medication information packet.    - Thoroughly discussed the risks and benefits of EAU antiviral therapy,Paxlovid.   -Reviewed the [facility] management decision algorithm for COVID-19 which is intended to provide management guidance for patients with mild to moderate COVID-19 symptoms not requiring hospitalization in the outpatient setting.   -Advised pt on potential drug interactions/contraindications associated with coadministration of Rosuvastatin with Paxlovid.   Instructed pt to HOLD doses of  Lypopumyqvys19 hours prior to the first dose of Paxlovid, HOLD doses during 5 days of Paxlovid therapy, and resume regular dosing 5 days after completion of Paxlovid treatment.  - Advised patient to stay home and self quarantine according to updated CDC guidance. Advised must be fever free for at least 24 hours to discontinue isolation.  -Tylenol/Motrin as needed for fever control. OTC/Prescribed appropriate symptomatic treatment.  - Strict ED precautions given for any emergent symptoms.  Discussed treatment options with patient. Patient expressed verbal understanding and agreement with treatment plan.

## 2024-09-09 RX ORDER — LISINOPRIL 5 MG/1
5 TABLET ORAL
Qty: 90 TABLET | Refills: 0 | Status: SHIPPED | OUTPATIENT
Start: 2024-09-09

## 2024-09-10 DIAGNOSIS — N92.0 MENORRHAGIA WITH REGULAR CYCLE: Primary | ICD-10-CM

## 2024-09-10 RX ORDER — NORETHINDRONE 0.35 MG/1
1 TABLET ORAL DAILY
Qty: 30 TABLET | Refills: 11 | Status: SHIPPED | OUTPATIENT
Start: 2024-09-10 | End: 2025-09-10

## 2024-09-10 NOTE — TELEPHONE ENCOUNTER
----- Message from Amber Chase sent at 9/10/2024 10:36 AM CDT -----  Regarding: Medical Assistance  Contact: Patient  Type:  Needs Medical Advice    Who Called: Patient   Symptoms (please be specific): n/a    How long has patient had these symptoms:  n/a   Pharmacy name and phone #:  [id number] - [place], [place] - [address] AT [facility] [address]   Phone: [phone]  Would the patient rather a call back or a response via [facility]? Call back   Best Call Back Number:  [phone]  Additional Information: Patient is attempting to get a refill on medication prescribed by physician but not listed on medication list please assist

## 2024-09-16 ENCOUNTER — TELEPHONE (OUTPATIENT)
Dept: PRIMARY CARE CLINIC | Facility: CLINIC | Age: 44
End: 2024-09-16
Payer: COMMERCIAL

## 2024-09-16 NOTE — TELEPHONE ENCOUNTER
----- Message from Emiliana López sent at 9/16/2024  8:48 AM CDT -----  Type:  Patient Requesting Referral    Who Called:[facility]  Does the patient already have the specialty appointment scheduled?:yes  If yes, what is the date of that appointment?:at lab now  Referral to What Specialty:labs  Reason for Referral:na  Does the patient want the referral with a specific physician?:[facility] lab  Is the specialist an [facility]?:na  Patient Requesting a Response?:yes  Would the patient rather a call back or a response via [facility]? call  Best Call Back Number:fax: [phone] phone:[phone]  Additional Information: patient is at [facility] now requesting orders to be sent asap

## 2024-09-24 ENCOUNTER — TELEPHONE (OUTPATIENT)
Dept: OBSTETRICS AND GYNECOLOGY | Facility: CLINIC | Age: 44
End: 2024-09-24
Payer: COMMERCIAL

## 2024-09-26 LAB — HBA1C MFR BLD: 7.5 % (ref 4–6)

## 2024-09-30 ENCOUNTER — OFFICE VISIT (OUTPATIENT)
Dept: PRIMARY CARE CLINIC | Facility: CLINIC | Age: 44
End: 2024-09-30
Payer: COMMERCIAL

## 2024-09-30 VITALS
RESPIRATION RATE: 16 BRPM | WEIGHT: 277 LBS | HEIGHT: 62 IN | TEMPERATURE: 98 F | HEART RATE: 86 BPM | BODY MASS INDEX: 50.97 KG/M2 | OXYGEN SATURATION: 97 % | DIASTOLIC BLOOD PRESSURE: 79 MMHG | SYSTOLIC BLOOD PRESSURE: 118 MMHG

## 2024-09-30 DIAGNOSIS — E66.01 MORBID OBESITY WITH BMI OF 50.0-59.9, ADULT: ICD-10-CM

## 2024-09-30 DIAGNOSIS — E78.2 MIXED HYPERLIPIDEMIA: ICD-10-CM

## 2024-09-30 DIAGNOSIS — E11.65 TYPE 2 DIABETES MELLITUS WITH HYPERGLYCEMIA, WITHOUT LONG-TERM CURRENT USE OF INSULIN: Primary | ICD-10-CM

## 2024-09-30 DIAGNOSIS — I10 BENIGN ESSENTIAL HTN: ICD-10-CM

## 2024-09-30 LAB — GLUCOSE SERPL-MCNC: 87 MG/DL (ref 70–110)

## 2024-09-30 PROCEDURE — 3008F BODY MASS INDEX DOCD: CPT | Mod: CPTII,,, | Performed by: INTERNAL MEDICINE

## 2024-09-30 PROCEDURE — 1160F RVW MEDS BY RX/DR IN RCRD: CPT | Mod: CPTII,,, | Performed by: INTERNAL MEDICINE

## 2024-09-30 PROCEDURE — 4010F ACE/ARB THERAPY RXD/TAKEN: CPT | Mod: CPTII,,, | Performed by: INTERNAL MEDICINE

## 2024-09-30 PROCEDURE — 3078F DIAST BP <80 MM HG: CPT | Mod: CPTII,,, | Performed by: INTERNAL MEDICINE

## 2024-09-30 PROCEDURE — 3051F HG A1C>EQUAL 7.0%<8.0%: CPT | Mod: CPTII,,, | Performed by: INTERNAL MEDICINE

## 2024-09-30 PROCEDURE — 99214 OFFICE O/P EST MOD 30 MIN: CPT | Mod: S$PBB,,, | Performed by: INTERNAL MEDICINE

## 2024-09-30 PROCEDURE — 3074F SYST BP LT 130 MM HG: CPT | Mod: CPTII,,, | Performed by: INTERNAL MEDICINE

## 2024-09-30 PROCEDURE — 1159F MED LIST DOCD IN RCRD: CPT | Mod: CPTII,,, | Performed by: INTERNAL MEDICINE

## 2024-09-30 NOTE — PROGRESS NOTES
Subjective:      Patient ID: Valerie Pérez is a 44 y.o. female.    Chief Complaint: Diabetes (2month f/u ) and Knee Pain (C/o bilateral knee pain, requesting new referral to ortho, currently est w/ [facility] )    Patient with questionable diagnosis of bipolar disorder and is being followed by Psychiatry/Ms Osullivan Margaret.  Patient is on Lamictal and denies any symptoms.    Patient has diabetes with last A1c of 6.1 improved from 7.2 and is on metformin and Mounjaro.  Patient reports home blood sugars are running between  fasting.  Patient lost another 6 lb (296-277) total of 19 lb.  Patient denies polyuria polydipsia, numbness in hands or feet.  Patient has hypothyroidism and last TSH was at goal.  Patient last LDL of 71 is improved from 88 and is close to goal.    Patient presented today with complains of bilateral knee pain x long.  Patient is being followed by Orthopedic and has received intra articular injection with much help.  Patient reports pain is mild-to-moderate in intensity, worsened with activity and improved with rest.    Review of Systems   Constitutional:  Positive for weight loss (6 lb). Negative for chills, diaphoresis, fever and malaise/fatigue.   HENT:  Negative for congestion, ear pain, sinus pain, sore throat and tinnitus.    Eyes:  Negative for blurred vision and photophobia.   Respiratory:  Negative for cough, hemoptysis, shortness of breath and wheezing.    Cardiovascular:  Negative for chest pain, palpitations, orthopnea, leg swelling and PND.   Gastrointestinal:  Negative for abdominal pain, blood in stool, constipation, diarrhea, heartburn, melena, nausea and vomiting.   Genitourinary:  Negative for dysuria, frequency and urgency.   Musculoskeletal:  Positive for joint pain. Negative for back pain, myalgias and neck pain.   Skin:  Negative for rash.   Neurological:  Negative for dizziness, tremors, seizures, loss of consciousness and weakness.   Endo/Heme/Allergies:  Negative  "for polydipsia.   Psychiatric/Behavioral:  Negative for depression and hallucinations. The patient does not have insomnia.      Objective:   /79 (BP Location: Right arm, Patient Position: Sitting)   Pulse 86   Temp 97.9 °F (36.6 °C) (Oral)   Resp 16   Ht 5' 2" (1.575 m)   Wt 125.6 kg (277 lb)   SpO2 97%   BMI 50.66 kg/m²     Physical Exam  Constitutional:       General: She is not in acute distress.     Appearance: She is obese. She is not diaphoretic.   Neck:      Thyroid: No thyromegaly.   Cardiovascular:      Rate and Rhythm: Normal rate and regular rhythm.      Heart sounds: Normal heart sounds. No murmur heard.  Pulmonary:      Effort: Pulmonary effort is normal. No respiratory distress.      Breath sounds: Normal breath sounds. No wheezing.   Abdominal:      General: Bowel sounds are normal. There is no distension.      Palpations: Abdomen is soft.      Tenderness: There is no abdominal tenderness.   Musculoskeletal:      Comments: Bilateral knees are not swollen, no redness or warmth noted  Diffuse crackles noted on movement of the joint  Range of motion is normal   Lymphadenopathy:      Cervical: No cervical adenopathy.   Neurological:      Mental Status: She is alert and oriented to person, place, and time.   Psychiatric:         Behavior: Behavior normal.         Thought Content: Thought content normal.         Judgment: Judgment normal.       Assessment:       ICD-10-CM ICD-9-CM   1. Type 2 diabetes mellitus with hyperglycemia, without long-term current use of insulin  E11.65 250.00     790.29   2. Mixed hyperlipidemia  E78.2 272.2   3. Benign essential HTN  I10 401.1   4. Morbid obesity with BMI of 50.0-59.9, adult  E66.01 278.01    Z68.43 V85.43       Plan:     Patient with diabetes with last A1c of 6.1 is at goal  Will continue medication  Patient is losing weight with Mounjaro.  Patient is encouraged to continue  Patient has hypertension and blood pressure seem under good control  Will " continue medication  Patient last LDL of 71 is very close to goal.  Will continue same medication for now  Patient has bilateral knee pain and has been evaluated by Orthopedic surgeon  Intra-articular injection has helped  Surgery is deferred secondary to weight.  Offered patient to use meloxicam...  Declined by patient  Recommended patient to use diclofenac gel  Patient deferred new referral to orthopedics    Medication List with Changes/Refills   Current Medications    AZELASTINE (ASTELIN) 137 MCG (0.1 %) NASAL SPRAY    1 spray (137 mcg total) by Nasal route 2 (two) times daily.    BLOOD SUGAR DIAGNOSTIC (TRUE METRIX GLUCOSE TEST STRIP) STRP    1 strip by Misc.(Non-Drug; Combo Route) route once daily.    FLUTICASONE PROPIONATE (FLONASE) 50 MCG/ACTUATION NASAL SPRAY    1 spray (50 mcg total) by Each Nostril route once daily.    LAMOTRIGINE (LAMICTAL) 25 MG TABLET    Take 25 mg by mouth every evening.    LANCETS 32 GAUGE MISC    1 lancet  by Misc.(Non-Drug; Combo Route) route once daily.    LEVOTHYROXINE (SYNTHROID) 75 MCG TABLET    Take 75 mcg by mouth.    LISINOPRIL (PRINIVIL,ZESTRIL) 5 MG TABLET    TAKE 1 TABLET(5 MG) BY MOUTH DAILY    METFORMIN (GLUCOPHAGE) 500 MG TABLET    Take 1 tablet (500 mg total) by mouth 2 (two) times daily with meals.    NORETHINDRONE (MICRONOR) 0.35 MG TABLET    Take 1 tablet (0.35 mg total) by mouth once daily.    PANTOPRAZOLE (PROTONIX) 40 MG TABLET    Take 40 mg by mouth once daily.    ROSUVASTATIN (CRESTOR) 20 MG TABLET    Take 1 tablet (20 mg total) by mouth once daily.    TIRZEPATIDE (MOUNJARO) 5 MG/0.5 ML PNIJ    Inject 5 mg into the skin every 7 days.   Discontinued Medications    NICOTINE (NICODERM CQ) 14 MG/24 HR        PYRILAMINE-CHLOPHEDIANOL 12.5-12.5 MG/5 ML LIQD    Take 10 mLs by mouth every 6 to 8 hours as needed (cough/cold).

## 2024-10-10 ENCOUNTER — PATIENT OUTREACH (OUTPATIENT)
Dept: ADMINISTRATIVE | Facility: HOSPITAL | Age: 44
End: 2024-10-10
Payer: COMMERCIAL

## 2024-11-01 RX ORDER — LEVOTHYROXINE SODIUM 75 UG/1
75 TABLET ORAL
Qty: 90 TABLET | Refills: 0 | Status: SHIPPED | OUTPATIENT
Start: 2024-11-01

## 2024-11-04 ENCOUNTER — TELEPHONE (OUTPATIENT)
Dept: PHARMACY | Facility: CLINIC | Age: 44
End: 2024-11-04
Payer: COMMERCIAL

## 2024-11-04 NOTE — TELEPHONE ENCOUNTER
[facility] Chart Review & Patient Outreach Details For Medication Adherence Project    Reason for Outreach Encounter: 3rd Party payor non-compliance report ([employer], [employer], [employer], etc)  2.  Patient Outreach Method: Reviewed patient chart  and [employer] message  3.   Medication in question: rosuvastatin     rosuvastatin  last filled  6/28/24 for 90 day supply    4.  Reviewed and or Updates Made To: Patient Chart  5. Outreach Outcomes and/or actions taken: Sent inquiry to patient: Waiting for response and Patient reminded to  prescription  Additional Notes:

## 2024-11-11 RX ORDER — LISINOPRIL 5 MG/1
5 TABLET ORAL DAILY
Qty: 90 TABLET | Refills: 3 | Status: SHIPPED | OUTPATIENT
Start: 2024-11-11

## 2024-12-12 ENCOUNTER — TELEPHONE (OUTPATIENT)
Dept: PHARMACY | Facility: CLINIC | Age: 44
End: 2024-12-12
Payer: COMMERCIAL

## 2024-12-12 NOTE — TELEPHONE ENCOUNTER
[facility] Chart Review & Patient Outreach Details For Medication Adherence Project    Reason for Outreach Encounter: 3rd Party payor non-compliance report ([employer], [employer], [employer], etc)  2.  Patient Outreach Method: Reviewed patient chart   3.   Medication in question:    Hyperlipidemia Medications               rosuvastatin (CRESTOR) 20 MG tablet Take 1 tablet (20 mg total) by mouth once daily.                  rosuvastatin  last filled  12/4/24 for 90 day supply      4.  Reviewed and or Updates Made To: Patient Chart  5. Outreach Outcomes and/or actions taken: Patient filled medication and is on track to be adherent  Additional Notes:

## 2024-12-15 DIAGNOSIS — E11.65 TYPE 2 DIABETES MELLITUS WITH HYPERGLYCEMIA, WITHOUT LONG-TERM CURRENT USE OF INSULIN: ICD-10-CM

## 2024-12-16 RX ORDER — TIRZEPATIDE 5 MG/.5ML
5 INJECTION, SOLUTION SUBCUTANEOUS
Qty: 4 PEN | Refills: 3 | Status: SHIPPED | OUTPATIENT
Start: 2024-12-16

## 2024-12-17 ENCOUNTER — PATIENT MESSAGE (OUTPATIENT)
Dept: PRIMARY CARE CLINIC | Facility: CLINIC | Age: 44
End: 2024-12-17
Payer: COMMERCIAL

## 2025-01-07 ENCOUNTER — OFFICE VISIT (OUTPATIENT)
Dept: PRIMARY CARE CLINIC | Facility: CLINIC | Age: 45
End: 2025-01-07
Payer: COMMERCIAL

## 2025-01-07 VITALS
WEIGHT: 273 LBS | RESPIRATION RATE: 16 BRPM | HEART RATE: 92 BPM | OXYGEN SATURATION: 97 % | HEIGHT: 62 IN | TEMPERATURE: 98 F | DIASTOLIC BLOOD PRESSURE: 87 MMHG | BODY MASS INDEX: 50.24 KG/M2 | SYSTOLIC BLOOD PRESSURE: 137 MMHG

## 2025-01-07 DIAGNOSIS — E66.813 CLASS 3 SEVERE OBESITY WITH SERIOUS COMORBIDITY AND BODY MASS INDEX (BMI) OF 45.0 TO 49.9 IN ADULT, UNSPECIFIED OBESITY TYPE: ICD-10-CM

## 2025-01-07 DIAGNOSIS — E03.9 HYPOTHYROIDISM, UNSPECIFIED TYPE: ICD-10-CM

## 2025-01-07 DIAGNOSIS — E66.01 CLASS 3 SEVERE OBESITY WITH SERIOUS COMORBIDITY AND BODY MASS INDEX (BMI) OF 45.0 TO 49.9 IN ADULT, UNSPECIFIED OBESITY TYPE: ICD-10-CM

## 2025-01-07 DIAGNOSIS — E11.65 TYPE 2 DIABETES MELLITUS WITH HYPERGLYCEMIA, WITHOUT LONG-TERM CURRENT USE OF INSULIN: Primary | ICD-10-CM

## 2025-01-07 DIAGNOSIS — I10 BENIGN ESSENTIAL HTN: ICD-10-CM

## 2025-01-07 LAB — GLUCOSE SERPL-MCNC: 107 MG/DL (ref 70–110)

## 2025-01-07 PROCEDURE — 3008F BODY MASS INDEX DOCD: CPT | Mod: CPTII,,, | Performed by: INTERNAL MEDICINE

## 2025-01-07 PROCEDURE — 99214 OFFICE O/P EST MOD 30 MIN: CPT | Mod: S$PBB,,, | Performed by: INTERNAL MEDICINE

## 2025-01-07 PROCEDURE — 3079F DIAST BP 80-89 MM HG: CPT | Mod: CPTII,,, | Performed by: INTERNAL MEDICINE

## 2025-01-07 PROCEDURE — 1159F MED LIST DOCD IN RCRD: CPT | Mod: CPTII,,, | Performed by: INTERNAL MEDICINE

## 2025-01-07 PROCEDURE — 1160F RVW MEDS BY RX/DR IN RCRD: CPT | Mod: CPTII,,, | Performed by: INTERNAL MEDICINE

## 2025-01-07 PROCEDURE — 3075F SYST BP GE 130 - 139MM HG: CPT | Mod: CPTII,,, | Performed by: INTERNAL MEDICINE

## 2025-01-07 RX ORDER — DEXTROAMPHETAMINE SACCHARATE, AMPHETAMINE ASPARTATE, DEXTROAMPHETAMINE SULFATE AND AMPHETAMINE SULFATE 2.5; 2.5; 2.5; 2.5 MG/1; MG/1; MG/1; MG/1
1 TABLET ORAL DAILY
COMMUNITY

## 2025-01-07 NOTE — PROGRESS NOTES
Subjective:      Patient ID: Valerie Pérez is a 44 y.o. female.    Chief Complaint: Diabetes (3month f/u )    : Patient with questionable diagnosis of bipolar disorder and is being followed by Psychiatry/Mr Osullivan Margaret.  Patient was on Lamictal but reports she is off of the medication.  Patient reports poor concentration and is started on Adderall by psychiatrist.  Advised patient about possible side effects of Adderall specially worsening of bipolar symptoms with the medication.    Patient has diabetes with last A1c of 7.5 worsen from 6.1.  Patient is on tolerating the medication well.  Home blood sugars are not being monitored.  Today Office blood sugar of 107 is decently controlled.  Patient has lost another 4 lb (296-273) total of 23 lb.  Patient has hypothyroidism and last TSH is at goal.  Repeat labs are ordered but not yet done.      Review of Systems   Constitutional:  Positive for weight loss (4 lb weight loss). Negative for chills, diaphoresis, fever and malaise/fatigue.   HENT:  Negative for congestion, ear pain, sinus pain, sore throat and tinnitus.    Eyes:  Negative for blurred vision and photophobia.   Respiratory:  Negative for cough, hemoptysis, shortness of breath and wheezing.    Cardiovascular:  Negative for chest pain, palpitations, orthopnea, leg swelling and PND.   Gastrointestinal:  Positive for constipation (Mild constipation). Negative for abdominal pain, blood in stool, diarrhea, heartburn, melena, nausea and vomiting.   Genitourinary:  Negative for dysuria, frequency and urgency.   Musculoskeletal:  Negative for back pain, myalgias and neck pain.   Skin:  Negative for rash.   Neurological:  Negative for dizziness, tremors, seizures, loss of consciousness and weakness.   Endo/Heme/Allergies:  Negative for polydipsia.   Psychiatric/Behavioral:  Negative for depression and hallucinations. The patient does not have insomnia.      Objective:   /87 (BP Location: Left arm, Patient  "Position: Sitting)   Pulse 92   Temp 98.1 °F (36.7 °C) (Oral)   Resp 16   Ht 5' 2" (1.575 m)   Wt 123.8 kg (273 lb)   SpO2 97%   BMI 49.93 kg/m²     Physical Exam: Patient is vitally stable with no acute distress    Assessment:       ICD-10-CM ICD-9-CM   1. Type 2 diabetes mellitus with hyperglycemia, without long-term current use of insulin  E11.65 250.00     790.29   2. Class 3 severe obesity with serious comorbidity and body mass index (BMI) of 45.0 to 49.9 in adult, unspecified obesity type  E66.813 278.01    E66.01 V85.42    Z68.42    3. Benign essential HTN  I10 401.1   4. Hypothyroidism, unspecified type  E03.9 244.9       Plan:     Patient with diabetes with last A1c of 7.5 is not at goal  Will increase Mounjaro to 7.5 mg.  Advised patient about interaction with or contraception and Mounjaro  Patient to discuss contraception with her gyn.  Repeat labs are ordered but not yet done advised patient to get labs done  Patient declined flu vaccine + pneumonia vaccine  Advised patient to get mammogram  Patient has class 3 obesity with BMI more than 49 and diabetes  Patient is losing weight with Mounjaro advised patient to adhere with medication  Patient has questionable diagnosis of bipolar disorder and is off of Lamictal without any symptoms  Patient also reports poor concentration and is on Adderall  Advised patient to discuss of Adderall with psychiatrist.  Patient has hypothyroidism and last TSH was at goal.  Will continue same medication  Will repeat thyroid function     Medication List with Changes/Refills   New Medications    TIRZEPATIDE 7.5 MG/0.5 ML PNIJ    Inject 7.5 mg into the skin every 7 days.   Current Medications    AZELASTINE (ASTELIN) 137 MCG (0.1 %) NASAL SPRAY    1 spray (137 mcg total) by Nasal route 2 (two) times daily.    BLOOD SUGAR DIAGNOSTIC (TRUE METRIX GLUCOSE TEST STRIP) STRP    1 strip by Misc.(Non-Drug; Combo Route) route once daily.    DEXTROAMPHETAMINE-AMPHETAMINE (ADDERALL) " 10 MG TAB    Take 1 tablet by mouth Daily.    FLUTICASONE PROPIONATE (FLONASE) 50 MCG/ACTUATION NASAL SPRAY    1 spray (50 mcg total) by Each Nostril route once daily.    LANCETS 32 GAUGE MISC    1 lancet  by Misc.(Non-Drug; Combo Route) route once daily.    LEVOTHYROXINE (SYNTHROID) 75 MCG TABLET    Take 1 tablet (75 mcg total) by mouth before breakfast.    LISINOPRIL (PRINIVIL,ZESTRIL) 5 MG TABLET    Take 1 tablet (5 mg total) by mouth once daily.    METFORMIN (GLUCOPHAGE) 500 MG TABLET    Take 1 tablet (500 mg total) by mouth 2 (two) times daily with meals.    NORETHINDRONE (MICRONOR) 0.35 MG TABLET    Take 1 tablet (0.35 mg total) by mouth once daily.    PANTOPRAZOLE (PROTONIX) 40 MG TABLET    Take 40 mg by mouth once daily.    ROSUVASTATIN (CRESTOR) 20 MG TABLET    Take 1 tablet (20 mg total) by mouth once daily.   Discontinued Medications    LAMOTRIGINE (LAMICTAL) 25 MG TABLET    Take 25 mg by mouth every evening.    TIRZEPATIDE (MOUNJARO) 5 MG/0.5 ML PNIJ    Inject 5 mg into the skin every 7 days.

## 2025-03-27 ENCOUNTER — TELEPHONE (OUTPATIENT)
Dept: PHARMACY | Facility: CLINIC | Age: 45
End: 2025-03-27
Payer: COMMERCIAL

## 2025-03-27 NOTE — TELEPHONE ENCOUNTER
[facility] Chart Review & Patient Outreach Details For Medication Adherence Project    Reason for Outreach Encounter: 3rd Party payor non-compliance report ([employer], [employer], [employer], etc)  2.  Patient Outreach Method: Reviewed patient chart   3.   Medication in question:    Hyperlipidemia Medications              rosuvastatin (CRESTOR) 20 MG tablet Take 1 tablet (20 mg total) by mouth once daily.                  LF 90d s 2/27/25    4.  Reviewed and or Updates Made To: Patient Chart  5. Outreach Outcomes and/or actions taken: Patient filled medication and is on track to be adherent  Additional Notes:

## 2025-07-26 DIAGNOSIS — N92.0 MENORRHAGIA WITH REGULAR CYCLE: ICD-10-CM

## 2025-07-26 RX ORDER — NORETHINDRONE 0.35 MG/1
1 TABLET ORAL DAILY
Qty: 30 TABLET | Refills: 11 | Status: CANCELLED | OUTPATIENT
Start: 2025-07-26 | End: 2026-07-26

## 2025-07-28 DIAGNOSIS — N92.0 MENORRHAGIA WITH REGULAR CYCLE: ICD-10-CM

## 2025-07-28 RX ORDER — NORETHINDRONE 0.35 MG/1
1 TABLET ORAL DAILY
Qty: 30 TABLET | Refills: 1 | Status: SHIPPED | OUTPATIENT
Start: 2025-07-28 | End: 2026-07-28

## 2025-07-28 NOTE — TELEPHONE ENCOUNTER
Pt last seen 5/8/2024.   Pt requesting refill on Micron[facility].   Medication pending.   Pharmacy up to date.   Please advise.  Thank you!